# Patient Record
Sex: FEMALE | Race: WHITE | NOT HISPANIC OR LATINO | Employment: OTHER | URBAN - NONMETROPOLITAN AREA
[De-identification: names, ages, dates, MRNs, and addresses within clinical notes are randomized per-mention and may not be internally consistent; named-entity substitution may affect disease eponyms.]

---

## 2023-10-02 ENCOUNTER — PREP FOR PROCEDURE (OUTPATIENT)
Dept: OPERATING ROOM | Facility: HOSPITAL | Age: 68
End: 2023-10-02
Payer: MEDICARE

## 2023-10-02 DIAGNOSIS — H02.9 BENIGN LESION OF EYELID: ICD-10-CM

## 2023-10-02 DIAGNOSIS — H25.811 COMBINED FORMS OF AGE-RELATED CATARACT OF RIGHT EYE: Primary | ICD-10-CM

## 2023-10-02 RX ORDER — PREDNISOLONE ACETATE 10 MG/ML
1 SUSPENSION/ DROPS OPHTHALMIC ONCE
Status: DISCONTINUED | OUTPATIENT
Start: 2023-10-05 | End: 2023-10-13 | Stop reason: HOSPADM

## 2023-10-02 RX ORDER — KETOROLAC TROMETHAMINE 5 MG/ML
1 SOLUTION OPHTHALMIC ONCE
Status: CANCELLED | OUTPATIENT
Start: 2023-10-05

## 2023-10-02 RX ORDER — MOXIFLOXACIN 5 MG/ML
1 SOLUTION/ DROPS OPHTHALMIC ONCE
Status: DISCONTINUED | OUTPATIENT
Start: 2023-10-05 | End: 2023-10-13 | Stop reason: HOSPADM

## 2023-10-02 RX ORDER — TETRACAINE HYDROCHLORIDE 5 MG/ML
1 SOLUTION OPHTHALMIC ONCE
Status: CANCELLED | OUTPATIENT
Start: 2023-10-05

## 2023-10-02 RX ORDER — POVIDONE-IODINE 5 %
1 SOLUTION, NON-ORAL OPHTHALMIC (EYE) ONCE
Status: CANCELLED | OUTPATIENT
Start: 2023-10-05

## 2023-10-02 NOTE — H&P
History Of Present Illness  Nicole Jesus is a 67 y.o. female presenting with Cataract Right eye.     Past Medical History  She has no past medical history on file.    Surgical History  She has no past surgical history on file.     Social History  She has no history on file for tobacco use, alcohol use, and drug use.    Family History  No family history on file.     Allergies  Patient has no allergy information on record.    Review of Systems     Physical Exam     Last Recorded Vitals  There were no vitals taken for this visit.    Relevant Results             Assessment/Plan   Diagnoses and all orders for this visit:  Combined forms of age-related cataract of right eye  -     Case Request Operating Room: Extraction Cataract with Insertion Intraocular Lens, Excision Lesion Eyelid; Standing  Benign lesion of eyelid  -     Case Request Operating Room: Extraction Cataract with Insertion Intraocular Lens, Excision Lesion Eyelid; Standing  Other orders  -     lidocaine 1%-phenylephrine 1.5% intravitreal injection 2 mL  -     prednisoLONE acetate (Pred-Forte) 1 % ophthalmic suspension 1 drop  -     moxifloxacin (Vigamox) 0.5 % ophthalmic solution 1 drop            Abdirizak Laguerre MD

## 2023-10-05 ENCOUNTER — ANESTHESIA (OUTPATIENT)
Dept: OPERATING ROOM | Facility: HOSPITAL | Age: 68
End: 2023-10-05
Payer: MEDICARE

## 2023-10-05 ENCOUNTER — ANESTHESIA EVENT (OUTPATIENT)
Dept: OPERATING ROOM | Facility: HOSPITAL | Age: 68
End: 2023-10-05
Payer: MEDICARE

## 2023-10-05 ENCOUNTER — HOSPITAL ENCOUNTER (OUTPATIENT)
Facility: HOSPITAL | Age: 68
Setting detail: OUTPATIENT SURGERY
Discharge: HOME | End: 2023-10-05
Attending: OPHTHALMOLOGY | Admitting: OPHTHALMOLOGY
Payer: MEDICARE

## 2023-10-05 VITALS
DIASTOLIC BLOOD PRESSURE: 76 MMHG | WEIGHT: 123.9 LBS | BODY MASS INDEX: 22.8 KG/M2 | OXYGEN SATURATION: 96 % | SYSTOLIC BLOOD PRESSURE: 149 MMHG | TEMPERATURE: 98.4 F | RESPIRATION RATE: 20 BRPM | HEART RATE: 96 BPM | HEIGHT: 62 IN

## 2023-10-05 DIAGNOSIS — H25.811 COMBINED FORMS OF AGE-RELATED CATARACT OF RIGHT EYE: Primary | ICD-10-CM

## 2023-10-05 PROBLEM — H02.9 BENIGN LESION OF EYELID: Status: RESOLVED | Noted: 2023-10-02 | Resolved: 2023-10-05

## 2023-10-05 PROCEDURE — 7100000009 HC PHASE TWO TIME - INITIAL BASE CHARGE: Performed by: OPHTHALMOLOGY

## 2023-10-05 PROCEDURE — 2580000001 HC RX 258 IV SOLUTIONS: Performed by: OPHTHALMOLOGY

## 2023-10-05 PROCEDURE — 7100000010 HC PHASE TWO TIME - EACH INCREMENTAL 1 MINUTE: Performed by: OPHTHALMOLOGY

## 2023-10-05 PROCEDURE — 3600000008 HC OR TIME - EACH INCREMENTAL 1 MINUTE - PROCEDURE LEVEL THREE: Performed by: OPHTHALMOLOGY

## 2023-10-05 PROCEDURE — C1780 LENS, INTRAOCULAR (NEW TECH): HCPCS | Performed by: OPHTHALMOLOGY

## 2023-10-05 PROCEDURE — 2500000004 HC RX 250 GENERAL PHARMACY W/ HCPCS (ALT 636 FOR OP/ED): Performed by: ANESTHESIOLOGY

## 2023-10-05 PROCEDURE — 2500000001 HC RX 250 WO HCPCS SELF ADMINISTERED DRUGS (ALT 637 FOR MEDICARE OP): Performed by: OPHTHALMOLOGY

## 2023-10-05 PROCEDURE — 2500000005 HC RX 250 GENERAL PHARMACY W/O HCPCS: Performed by: OPHTHALMOLOGY

## 2023-10-05 PROCEDURE — 2760000001 HC OR 276 NO HCPCS: Performed by: OPHTHALMOLOGY

## 2023-10-05 PROCEDURE — 3700000001 HC GENERAL ANESTHESIA TIME - INITIAL BASE CHARGE: Performed by: OPHTHALMOLOGY

## 2023-10-05 PROCEDURE — 2720000007 HC OR 272 NO HCPCS: Performed by: OPHTHALMOLOGY

## 2023-10-05 PROCEDURE — 3700000002 HC GENERAL ANESTHESIA TIME - EACH INCREMENTAL 1 MINUTE: Performed by: OPHTHALMOLOGY

## 2023-10-05 PROCEDURE — 3600000003 HC OR TIME - INITIAL BASE CHARGE - PROCEDURE LEVEL THREE: Performed by: OPHTHALMOLOGY

## 2023-10-05 DEVICE — IMPLANTABLE DEVICE: Type: IMPLANTABLE DEVICE | Site: EYE | Status: FUNCTIONAL

## 2023-10-05 RX ORDER — ASPIRIN 81 MG/1
81 TABLET ORAL DAILY
COMMUNITY
Start: 2022-10-04

## 2023-10-05 RX ORDER — CILOSTAZOL 100 MG/1
100 TABLET ORAL 2 TIMES DAILY
COMMUNITY
Start: 2022-07-13

## 2023-10-05 RX ORDER — TIZANIDINE 4 MG/1
4 TABLET ORAL DAILY
COMMUNITY
Start: 2017-05-17

## 2023-10-05 RX ORDER — CHOLECALCIFEROL (VITAMIN D3) 25 MCG
1000 TABLET ORAL
COMMUNITY

## 2023-10-05 RX ORDER — SODIUM CHLORIDE, SODIUM LACTATE, POTASSIUM CHLORIDE, CALCIUM CHLORIDE 600; 310; 30; 20 MG/100ML; MG/100ML; MG/100ML; MG/100ML
100 INJECTION, SOLUTION INTRAVENOUS CONTINUOUS
Status: DISCONTINUED | OUTPATIENT
Start: 2023-10-05 | End: 2023-10-05 | Stop reason: HOSPADM

## 2023-10-05 RX ORDER — GABAPENTIN 300 MG/1
300 CAPSULE ORAL DAILY
COMMUNITY
Start: 2018-02-08 | End: 2023-12-09

## 2023-10-05 RX ORDER — LIDOCAINE HYDROCHLORIDE AND EPINEPHRINE 10; 10 MG/ML; UG/ML
INJECTION, SOLUTION INFILTRATION; PERINEURAL AS NEEDED
Status: DISCONTINUED | OUTPATIENT
Start: 2023-10-05 | End: 2023-10-05 | Stop reason: HOSPADM

## 2023-10-05 RX ORDER — KETOROLAC TROMETHAMINE 5 MG/ML
1 SOLUTION OPHTHALMIC ONCE
Status: COMPLETED | OUTPATIENT
Start: 2023-10-05 | End: 2023-10-05

## 2023-10-05 RX ORDER — MIDAZOLAM HYDROCHLORIDE 1 MG/ML
1 INJECTION INTRAMUSCULAR; INTRAVENOUS ONCE
Status: COMPLETED | OUTPATIENT
Start: 2023-10-05 | End: 2023-10-05

## 2023-10-05 RX ORDER — LISINOPRIL AND HYDROCHLOROTHIAZIDE 12.5; 2 MG/1; MG/1
1 TABLET ORAL
COMMUNITY
Start: 2017-06-06

## 2023-10-05 RX ORDER — TETRACAINE HYDROCHLORIDE 5 MG/ML
1 SOLUTION OPHTHALMIC ONCE
Status: COMPLETED | OUTPATIENT
Start: 2023-10-05 | End: 2023-10-05

## 2023-10-05 RX ORDER — MULTIVITAMIN
1 TABLET ORAL
COMMUNITY

## 2023-10-05 RX ORDER — MIDAZOLAM HYDROCHLORIDE 1 MG/ML
INJECTION, SOLUTION INTRAMUSCULAR; INTRAVENOUS AS NEEDED
Status: DISCONTINUED | OUTPATIENT
Start: 2023-10-05 | End: 2023-10-05

## 2023-10-05 RX ORDER — POVIDONE-IODINE 5 %
1 SOLUTION, NON-ORAL OPHTHALMIC (EYE) ONCE
Status: COMPLETED | OUTPATIENT
Start: 2023-10-05 | End: 2023-10-05

## 2023-10-05 RX ORDER — ALPRAZOLAM 0.5 MG/1
1 TABLET ORAL NIGHTLY PRN
COMMUNITY
Start: 2023-08-28

## 2023-10-05 RX ORDER — NYSTATIN 100000 [USP'U]/ML
500000 SUSPENSION ORAL EVERY 6 HOURS PRN
COMMUNITY
Start: 2023-06-12 | End: 2024-03-29 | Stop reason: HOSPADM

## 2023-10-05 RX ORDER — ATORVASTATIN CALCIUM 10 MG/1
10 TABLET, FILM COATED ORAL
COMMUNITY
Start: 2023-06-12

## 2023-10-05 RX ORDER — CELECOXIB 200 MG/1
200 CAPSULE ORAL DAILY
COMMUNITY
Start: 2017-05-11

## 2023-10-05 RX ADMIN — MIDAZOLAM HYDROCHLORIDE 1 MG: 1 INJECTION, SOLUTION INTRAMUSCULAR; INTRAVENOUS at 13:29

## 2023-10-05 RX ADMIN — TETRACAINE HYDROCHLORIDE 1 DROP: 5 SOLUTION OPHTHALMIC at 12:28

## 2023-10-05 RX ADMIN — POVIDONE-IODINE 1 APPLICATION: 5 SOLUTION OPHTHALMIC at 12:28

## 2023-10-05 RX ADMIN — KETOROLAC TROMETHAMINE 1 DROP: 5 SOLUTION OPHTHALMIC at 12:29

## 2023-10-05 RX ADMIN — POLYVINYL ALCOHOL, POVIDONE 1 DROP: 14; 6 SOLUTION/ DROPS OPHTHALMIC at 12:29

## 2023-10-05 RX ADMIN — MIDAZOLAM 1 MG: 1 INJECTION INTRAMUSCULAR; INTRAVENOUS at 13:50

## 2023-10-05 RX ADMIN — SODIUM CHLORIDE, POTASSIUM CHLORIDE, SODIUM LACTATE AND CALCIUM CHLORIDE 100 ML/HR: 600; 310; 30; 20 INJECTION, SOLUTION INTRAVENOUS at 12:32

## 2023-10-05 RX ADMIN — PHENYLEPHRINE HYDROCHLORIDE 1 DROP: 100 SOLUTION/ DROPS OPHTHALMIC at 12:29

## 2023-10-05 RX ADMIN — MIDAZOLAM 1 MG: 1 INJECTION INTRAMUSCULAR; INTRAVENOUS at 13:47

## 2023-10-05 SDOH — HEALTH STABILITY: MENTAL HEALTH: CURRENT SMOKER: 1

## 2023-10-05 ASSESSMENT — PAIN SCALES - GENERAL
PAINLEVEL_OUTOF10: 0 - NO PAIN
PAIN_LEVEL: 0

## 2023-10-05 ASSESSMENT — COLUMBIA-SUICIDE SEVERITY RATING SCALE - C-SSRS
2. HAVE YOU ACTUALLY HAD ANY THOUGHTS OF KILLING YOURSELF?: NO
1. IN THE PAST MONTH, HAVE YOU WISHED YOU WERE DEAD OR WISHED YOU COULD GO TO SLEEP AND NOT WAKE UP?: NO

## 2023-10-05 ASSESSMENT — PAIN - FUNCTIONAL ASSESSMENT: PAIN_FUNCTIONAL_ASSESSMENT: 0-10

## 2023-10-05 NOTE — ANESTHESIA PREPROCEDURE EVALUATION
Patient: Nicole Jesus    Procedure Information       Date/Time: 10/05/23 1330    Procedures:       CATARACT EXTRACTION W/IOL IMPLANT R EYE (Right: Eye)      REMOVAL SKIN LESION R UPPER LID (Right: Eye)    Location: [facility] OR 05 / Virtual [facility] OR    Surgeons: Abdirizak Laguerre MD            Relevant Problems   No relevant active problems       Clinical information reviewed:   Tobacco  Allergies  Meds   Med Hx  Surg Hx   Fam Hx  Soc Hx        NPO Detail:  NPO/Void Status  Date of Last Liquid: 10/04/23  Time of Last Liquid: 2359  Date of Last Solid: 10/04/23  Time of Last Solid: 2359  Time of Last Void: 1145         Physical Exam    Airway  Mallampati: II     Cardiovascular    Dental   Comments: Poor dentition   Pulmonary    Abdominal            Anesthesia Plan    ASA 2     MAC     The patient is a current smoker.  Patient was not previously instructed to abstain from smoking on day of procedure.  Patient did not smoke on day of procedure.    intravenous induction   Anesthetic plan and risks discussed with patient.

## 2023-10-05 NOTE — ANESTHESIA POSTPROCEDURE EVALUATION
Patient: Nicole Jesus    Procedure Summary       Date: 10/05/23 Room / Location: [facility] OR 05 / [facility]    Anesthesia Start: 1340 Anesthesia Stop: 1409    Procedures:       CATARACT EXTRACTION W/IOL IMPLANT R EYE (Right: Eye)      REMOVAL SKIN LESION R UPPER LID (Right: Eye) Diagnosis:       Combined forms of age-related cataract of right eye      Benign lesion of eyelid      (Combined forms of age-related cataract of right eye [H25.811])      (Benign lesion of eyelid [H02.9])    Surgeons: Abdirizak Laguerre MD Responsible Provider: Dallas Rodriguez MD    Anesthesia Type: MAC ASA Status: 2            Anesthesia Type: MAC    Vitals     BP     Temp     Pulse     Resp     SpO2         Anesthesia Post Evaluation    Patient location during evaluation: PACU  Patient participation: complete - patient participated  Level of consciousness: awake and alert  Pain score: 0  Pain management: adequate  Airway patency: patent  Cardiovascular status: acceptable  Respiratory status: acceptable  Hydration status: acceptable        No notable events documented.

## 2023-10-05 NOTE — H&P
Reason for Visit    Reason for Visit -  Reason Comments   Cataract Follow Up Patient is here to sign basic consents for the right eye.   Blurred Vision Right Eye     Difficulty Reading Right Eye     Glare Right Eye     Encounter Details    Encounter Details  Date Type Department Care Team Description   09/22/2023 9:15 AM EDT Office Visit [facility] Ophthalmology   [address] [id number]   [phone]  Abdirizak Laguerre MD   [address] [place] [address]   [phone] (Work)   [phone] (Fax)  Combined form of age-related cataract, right eye (Primary Dx);  Combined form of age-related cataract, left eye;  Benign lesion of eyelid;  Amblyopia of both eyes;  Essential hypertension;  Hypercholesteremia     Social History  - documented as of this encounter  Social History  Tobacco Use Types Packs/Day Years Used Date   Smoking Tobacco: Every Day Cigarettes 1.5   Started: 01/06/1971   Smokeless Tobacco: Never             Social History  Area Deprivation Index Answer Date Recorded   National Score (1-100), lower number is lower risk 57     State Score (1-10), lower number is lower risk 3     Data from: [email or web address] Last address used for calculation [address] [place] [address]       Social History  Sex and Gender Information Value Date Recorded   Sex Assigned at Birth Not on file     Gender Identity Not on file     Sexual Orientation Not on file       Last Filed Vital Signs  - documented in this encounter  Last Filed Vital Signs  Vital Sign Reading Time Taken Comments   Blood Pressure 148/69 09/22/2023 10:04 AM EDT     Pulse 111 09/22/2023 10:04 AM EDT     Temperature - -     Respiratory Rate - -     Oxygen Saturation - -     Inhaled Oxygen Concentration - -     Weight - -     Height - -     Body Mass Index - -       Patient Instructions  - documented in this encounter  Patient Instructions  Abdirizak Laguerre MD - 09/22/2023 9:53 AM EDT   Formatting of this note is different  from the original.  Current Ophthalmic Meds        fluorometholone (FML LIQUID FILM) 0.1 % ophthalmic suspension Use 1 Drop in both eyes three times daily, until scheduled surgery date, then stop in the right eye.    Continue:  Systane Complete solution instill 1 drop 3 times daily Both Eyes.    Plan Cataract Surgery with Monofocal Intraocular Lens Implant And Growh removal Right Eye on 10/05/2023 at [facility].    If you have any questions please contact our office at [phone].  After office hours or on the weekend, please call Dr. Laguerre on his cell phone at [phone].    Electronically signed by Abdirizak Laguerre MD at 09/22/2023 10:08 AM EDT     Progress Notes  - documented in this encounter  Abdirizak Laguerre MD - 09/22/2023 9:49 AM EDT  Formatting of this note is different from the original.  ASSESSMENT/PLAN:  1. Combined form of age-related cataract, right eye - ICD9: 366.19, ICD10: H25.811 (primary diagnosis)  2. Combined form of age-related cataract, left eye - ICD9: 366.19, ICD10: H25.812    Blood pressure 148/69, pulse 111.    Cataract Presurgical Documentation    Cataract: Right eye (OD) then Left eye (OS)    Current Visual Acuity  Right Eye Distance CC 20/50  Left Eye Distance CC 20/40    Visual Function: Nicole Jesus states that the decline in vision from the cataract impedes her abilities as listed in the HPI, as well as other activities of daily living.    Nicole Jesus has confirmed that she is no longer able to function adequately on a day-to-day basis because of her current visual condition.  Upon eye examination, patient was found to have a visually significant cataract right eye. Discussed cataract surgery with patient and different intraocular lens implant options with patient: basic monofocal intraocular lens implant, Toric intraocular lens implant, and presbyopia correction intraocular lens implant. In my medical opinion, based on medical history  and ocular examination, cataract surgery with intraocular lens implant will correct patient's vision and improve quality of patient's daily living activities. Patient wishes to have traditional cataract surgery with basic intraocular lens right eye on 10/05/2023 at [facility]. Patient wishes to have cataract surgery with the option stated above. Patient understands that an intraocular lens implant does not necessarily replace the need for glasses. Patient understands that it is impossible for the surgeon to inform him/her of every possible complication that may occur. The surgeon has answered all of the patient's questions. Patient understands that if he/she has a mature or dense cataract, pseudoexfoliation cataract, or history of use of Flomax, he/she may require the use of Maluyugin Ring and/or Vision Blue during surgery. Patient understands the risks, benefits, and alternatives to surgery.  Further, it is my medical opinion that the cataract is the primary cause, or at least a significantly contributory cause of her visual dysfunction. With uncomplicated cataract surgery and lens implantation, it is my expectation that her visual function and quality of life will improve, significantly.    The risks, benefits, alternatives, personnel and complications of cataract surgery with lens implantation were discussed with Nicole Jesus in detail. she appeared to understand and asked that I proceed with plans for surgery.    PHYSICAL EXAM:    Vital Signs:  Blood pressure 148/69, pulse 111.    Respiratory:  Normal breath sounds, no wheezing.    CARD:  Normal heart sounds 1 & 2, normal sinus rhythm.    Current Ophthalmic Meds        fluorometholone (FML LIQUID FILM) 0.1 % ophthalmic suspension Use 1 Drop in both eyes three times daily, until scheduled surgery date, then stop in the right eye.    Continue:  Systane Complete solution instill 1 drop 3 times daily Both Eyes.    3. Benign lesion  of eyelid - ICD9: 374.9, ICD10: H02.9    -Patient is scheduled for excision of benign skin lesion right upper eyelid on 10/05/2023 at [facility].    4. Amblyopia of both eyes - ICD9: 368.00, ICD10: H53.003    Monitor    5. Essential hypertension - ICD9: 401.9, ICD10: I10    Continue to monitor with primary care physician.    6. Hypercholesteremia - ICD9: 272.0, ICD10: E78.00    Continue to monitor with primary care physician.    Abdirizak Laguerre MD  I have confirmed and edited as necessary the relevant ophthalmic history, review of systems, surgical history, and ophthalmological examination findings as obtained by the ophthalmic technical staff. I have seen and examined Nicole Jesus. I have discussed the examination findings, diagnosis, and treatment options with Nicole Jesus and/or her family. I have also reviewed and agree with the assessment and plan as stated above and agree with all its relevant components. I gave the patient the opportunity to ask questions about the findings, diagnosis, and treatment options.      Electronically signed by Abdirizak Laguerre MD at 09/22/2023 10:09 AM EDT   Plan of Treatment  - documented as of this encounter  Plan of Treatment - Upcoming Encounters  Upcoming Encounters  Date Type Department Care Team Description   10/06/2023 1:00 PM EDT Office Visit [facility] Ophthalmology   [address] [place] [address]   [phone]  Abdirizak Laguerre MD   [address] [place] [address]   [phone] (Work)   [phone] (Fax)        Plan of Treatment - Scheduled Procedures  Scheduled Procedures  Name Priority Associated Diagnoses Date/Time   SURGERY AT [facility] [place] operated by [facility]   Combined form of age-related cataract, right eye   Lesion of right eyelid  10/05/2023 11:55 AM EDT     Visit Diagnoses  - documented in this encounter  Visit Diagnoses  Diagnosis   Combined form of age-related cataract, right eye - Primary    Combined form of age-related  cataract, left eye    Benign lesion of eyelid   Unspecified disorder of eyelid    Amblyopia of both eyes   Amblyopia, unspecified    Essential hypertension   Unspecified essential hypertension    Hypercholesteremia   Pure hypercholesterolemia      Eye Exam    Eye Exam - Visual Acuity (Snellen - Linear)  Visual Acuity (Snellen - Linear)    Right eye Left eye   Dist cc 20/50 -2 20/40 -1   Near cc J7 J7     Eye Exam  Correction: Glasses     Eye Exam - Tonometry (Applanation, 10:00 AM)  Tonometry (Applanation, 10:00 AM)    Right eye Left eye   Pressure 13 13     Eye Exam - Pupils  Pupils    Pupils Dark Light Shape React APD   Right eye PERRL 4 2 Round Brisk None   Left eye PERRL 4 2 Round Brisk None     Eye Exam - Visual Fields  Visual Fields    Right eye Left eye     Full Full     Eye Exam - Extraocular Movement  Extraocular Movement    Right eye Left eye     Full Full     Eye Exam - Neuro/Psych  Neuro/Psych  Oriented x3: Yes   Mood/Affect: Normal     Eye Exam - Glare Testing  Glare Testing    High   Right eye     Left eye 20/100     Eye Exam - External Exam  External Exam    Right eye Left eye   External Normal including orbits and preauricular lymph nodes Normal including orbits and preauricular lymph nodes     Eye Exam - Slit Lamp Exam  Slit Lamp Exam    Right eye Left eye   Lids/Lashes Multiple skin lesion upper lids (3), each growth 3 mm in size, ptosis Multiple skin lesion 3 upper lid, lesion left lateral canthus, ptosis3 mm in size each   Conjunctiva/Sclera White and quiet White and quiet   Cornea Normal epithelium, stroma, endothelium, and tear film Normal epithelium, stroma, endothelium, and tear film   Anterior Chamber Deep and quiet Deep and quiet   Iris Round and reactive Round and reactive   Lens 3-4+ Nuclear sclerotic cataract, 3-4+ Posterior subcapsular cataract 3-4+ Nuclear sclerotic cataract, 3-4+ Posterior subcapsular cataract   Anterior Vitreous Vitreous floaters Vitreous floaters     Eye Exam -  Fundus Exam  Fundus Exam    Right eye Left eye   Disc Normal Normal   C/D Ratio 0.2 0.2   Macula Normal Normal   Vessels Normal Normal   Periphery Normal Normal     Eye Exam - Wearing Rx  Wearing Rx    Sphere Cylinder Axis Add   Right eye +4.50 +1.25 175 +1.50   Left eye +2.50 +0.75 164 +2.00     Care Teams  - documented as of this encounter  Care Teams  Team Member Relationship Specialty Start Date End Date   Shimon Askew (Hist)   [place] [address]  PCP - General Family Medicine 9/6/23

## 2023-10-05 NOTE — OP NOTE
CATARACT EXTRACTION W/IOL IMPLANT R EYE (R), REMOVAL SKIN LESION R UPPER LID (R) Operative Note     Date: 10/5/2023  OR Location: [facility] OR    Name: Nicole Jesus, : 1955, Age: 67 y.o., MRN: [id number], Sex: female    Diagnosis  Pre-op Diagnosis     * Combined forms of age-related cataract of right eye [H25.811]     * Benign lesion of eyelid [H02.9] Post-op Diagnosis     * Combined forms of age-related cataract of right eye [H25.811]     * Benign lesion of eyelid [H02.9]     Procedures  CATARACT EXTRACTION W/IOL IMPLANT R EYE  47490 - AL XCAPSL CTRC RMVL INSJ IO LENS PROSTH W/O ECP    REMOVAL SKIN LESION R UPPER LID  93564 - AL EXC LESION EYELID W/O CLSR/W/SIMPLE DIR CLOSURE  3mm in size x 3    Surgeons      * Abdirizak Laguerre - Primary    Resident/Fellow/Other Assistant:  No surgical staff documented.    Procedure Summary  Anesthesia: Monitor Anesthesia Care  ASA: II  Anesthesia Staff: Anesthesiologist: Dallas Rodriguez MD  Estimated Blood Loss: none  Intra-op Medications:   Medication Name Total Dose   lidocaine-epinephrine (Xylocaine W/EPI) 1 %-1:100,000 injection 0.5 mL     Anesthesia Record        Intraprocedure I/O Totals       None      Specimen: No specimens collected     Staff:   Circulator: Mike Ortiz RN  Scrub Person: Alex Colindres RN    Drains and/or Catheters: * None in log *    Implants:  Implants       Type Name Action Serial No.      Lens LENS, INTRAOCULAR, SN60WF 29.5 - [id number] - [id number] Implanted [id number]              Findings: Combined Form Age Related Cataract Right Eye                    Benign Lesion of Eyelid Right Upper Eyelid    Indications: Nicole Jesus is an 67 y.o. female who is having surgery for Combined forms of age-related cataract of right eye [H25.811]  Benign lesion of eyelid [H02.9] 3mm in size x 3    The patient was correctly identified and the patient's operative eye was marked with a marking pen and verified with the patient in the pre-operative area.    The operative eye was dilated in the preoperative area.  The patient was then taken to the operating room where timeout was performed before starting the procedure. Combined anesthesia  with intravenous sedation and topical tetracaine eyedrops were instilled into the right eye. The operative eye  was prepped and draped in the standard sterile ophthalmic fashion in  preparation for ophthalmic surgery.  A Jesus wire speculum was then inserted between the eyelids of the right eye and the operating microscope was placed over the right eye.  A paracentesis incision was made approximately 30° away from the planned surgical incision site with the help of MVR blade.  1% lidocaine MPF with Phenylephrine 1.5% PF was injected into the anterior chamber through the paracentesis incision. A near limbal clear corneal incision was fashioned in the temporal quadrant just outside the vascular arcade and Viscoat was injected into anterior chamber to firm the eye. A bent needle cystotome was used and Utrata forceps were utilized to create a continuous curvilinear capsulorrhexis.  BSS was injected beneath the anterior capsule to hydrodissect the nucleus from adjacent cortex and capsule.  The residual cortex was then aspirated with irrigation/aspiration handpiece. The posterior capsule was then polished with the help of soft irrigation-aspiration tip.  Provisc viscoelastic was then injected into the eye to reform the anterior chamber and to open the capsular bag.  The intraocular lens implant was taken from its sterile wrapping, inspected under the surgical microscope and found to be in good condition. The intraocular lens implant 29.5D was injected into the capsule bag. The Provisc was then aspirated from the anterior chamber and from behind the intraocular lens implant.  The anterior chamber was inflated with the help of BSS to moderate tension.  And the edges of the surgical incision were then hydrated with the help of BSS.   Vigamox was then injected into the anterior chamber and into the capsule bag through the paracentesis incision. The surgical wound was then inspected and found to be watertight.  The wire speculum and drapes were then removed.      Attention was then given to the benign lesion right upper eyelid.  1% Lidocaine with epinephrine ws injected at the site.  The lesion was 3mm in size times 3 and was removed with blunt sharp dissection.  The bleeding was controlled with cautery.    Pred Forte eyedrops, Acular eyedrops and Betadine 5% sterile ophthalmic solution were instilled in the conjunctival sac.     The patient tolerated the procedure well and was taken to recovery room in stable condition.    Attending Attestation: I performed the procedure.    Abdirizak Laguerre  Phone Number: [phone]

## 2023-10-19 ENCOUNTER — PREP FOR PROCEDURE (OUTPATIENT)
Dept: OPERATING ROOM | Facility: HOSPITAL | Age: 68
End: 2023-10-19
Payer: MEDICARE

## 2023-10-19 DIAGNOSIS — H25.812 COMBINED FORMS OF AGE-RELATED CATARACT OF LEFT EYE: Primary | ICD-10-CM

## 2023-10-19 RX ORDER — KETOROLAC TROMETHAMINE 5 MG/ML
1 SOLUTION OPHTHALMIC
Status: CANCELLED | OUTPATIENT
Start: 2023-11-09 | End: 2023-11-09

## 2023-10-19 RX ORDER — TETRACAINE HYDROCHLORIDE 5 MG/ML
1 SOLUTION OPHTHALMIC ONCE
Status: CANCELLED | OUTPATIENT
Start: 2023-11-09

## 2023-10-19 RX ORDER — PREDNISOLONE ACETATE 10 MG/ML
1 SUSPENSION/ DROPS OPHTHALMIC ONCE
Status: CANCELLED | OUTPATIENT
Start: 2023-11-09

## 2023-10-19 RX ORDER — POVIDONE-IODINE 5 %
SOLUTION, NON-ORAL OPHTHALMIC (EYE) ONCE
Status: CANCELLED | OUTPATIENT
Start: 2023-11-09

## 2023-11-07 NOTE — PREPROCEDURE INSTRUCTIONS
No outpatient medications have been marked as taking for the 11/9/23 encounter (Hospital Encounter).                       NPO Instructions:  Nothing to eat or drink after midnight      Additional Instructions:     Will need  home. Will receive call day before surgery with arrival time

## 2023-11-08 ENCOUNTER — ANESTHESIA EVENT (OUTPATIENT)
Dept: OPERATING ROOM | Facility: HOSPITAL | Age: 68
End: 2023-11-08
Payer: MEDICARE

## 2023-11-09 ENCOUNTER — HOSPITAL ENCOUNTER (OUTPATIENT)
Facility: HOSPITAL | Age: 68
Setting detail: OUTPATIENT SURGERY
Discharge: HOME | End: 2023-11-09
Attending: OPHTHALMOLOGY | Admitting: OPHTHALMOLOGY
Payer: MEDICARE

## 2023-11-09 ENCOUNTER — ANESTHESIA (OUTPATIENT)
Dept: OPERATING ROOM | Facility: HOSPITAL | Age: 68
End: 2023-11-09
Payer: MEDICARE

## 2023-11-09 VITALS
BODY MASS INDEX: 23.25 KG/M2 | HEIGHT: 62 IN | TEMPERATURE: 97.5 F | WEIGHT: 126.32 LBS | RESPIRATION RATE: 22 BRPM | HEART RATE: 83 BPM | OXYGEN SATURATION: 97 % | SYSTOLIC BLOOD PRESSURE: 139 MMHG | DIASTOLIC BLOOD PRESSURE: 8 MMHG

## 2023-11-09 PROBLEM — H25.812 COMBINED FORMS OF AGE-RELATED CATARACT OF LEFT EYE: Status: RESOLVED | Noted: 2023-10-19 | Resolved: 2023-11-09

## 2023-11-09 PROCEDURE — 3600000008 HC OR TIME - EACH INCREMENTAL 1 MINUTE - PROCEDURE LEVEL THREE: Performed by: OPHTHALMOLOGY

## 2023-11-09 PROCEDURE — 3700000001 HC GENERAL ANESTHESIA TIME - INITIAL BASE CHARGE: Performed by: OPHTHALMOLOGY

## 2023-11-09 PROCEDURE — 2500000004 HC RX 250 GENERAL PHARMACY W/ HCPCS (ALT 636 FOR OP/ED): Performed by: ANESTHESIOLOGY

## 2023-11-09 PROCEDURE — 2720000007 HC OR 272 NO HCPCS: Performed by: OPHTHALMOLOGY

## 2023-11-09 PROCEDURE — 3700000002 HC GENERAL ANESTHESIA TIME - EACH INCREMENTAL 1 MINUTE: Performed by: OPHTHALMOLOGY

## 2023-11-09 PROCEDURE — 7100000009 HC PHASE TWO TIME - INITIAL BASE CHARGE: Performed by: OPHTHALMOLOGY

## 2023-11-09 PROCEDURE — 2760000001 HC OR 276 NO HCPCS: Performed by: OPHTHALMOLOGY

## 2023-11-09 PROCEDURE — 7100000010 HC PHASE TWO TIME - EACH INCREMENTAL 1 MINUTE: Performed by: OPHTHALMOLOGY

## 2023-11-09 PROCEDURE — 3600000003 HC OR TIME - INITIAL BASE CHARGE - PROCEDURE LEVEL THREE: Performed by: OPHTHALMOLOGY

## 2023-11-09 PROCEDURE — 2500000005 HC RX 250 GENERAL PHARMACY W/O HCPCS: Performed by: OPHTHALMOLOGY

## 2023-11-09 PROCEDURE — C1780 LENS, INTRAOCULAR (NEW TECH): HCPCS | Performed by: OPHTHALMOLOGY

## 2023-11-09 PROCEDURE — 2500000001 HC RX 250 WO HCPCS SELF ADMINISTERED DRUGS (ALT 637 FOR MEDICARE OP): Performed by: OPHTHALMOLOGY

## 2023-11-09 DEVICE — IMPLANTABLE DEVICE: Type: IMPLANTABLE DEVICE | Site: EYE | Status: FUNCTIONAL

## 2023-11-09 RX ORDER — SODIUM CHLORIDE, SODIUM LACTATE, POTASSIUM CHLORIDE, CALCIUM CHLORIDE 600; 310; 30; 20 MG/100ML; MG/100ML; MG/100ML; MG/100ML
100 INJECTION, SOLUTION INTRAVENOUS CONTINUOUS
Status: DISCONTINUED | OUTPATIENT
Start: 2023-11-09 | End: 2023-11-09 | Stop reason: HOSPADM

## 2023-11-09 RX ORDER — MIDAZOLAM HYDROCHLORIDE 1 MG/ML
2 INJECTION INTRAMUSCULAR; INTRAVENOUS ONCE AS NEEDED
Status: DISCONTINUED | OUTPATIENT
Start: 2023-11-09 | End: 2023-11-09 | Stop reason: HOSPADM

## 2023-11-09 RX ORDER — KETOROLAC TROMETHAMINE 5 MG/ML
1 SOLUTION OPHTHALMIC
Status: COMPLETED | OUTPATIENT
Start: 2023-11-09 | End: 2023-11-09

## 2023-11-09 RX ORDER — LIDOCAINE HYDROCHLORIDE 20 MG/ML
INJECTION, SOLUTION INFILTRATION; PERINEURAL AS NEEDED
Status: DISCONTINUED | OUTPATIENT
Start: 2023-11-09 | End: 2023-11-09 | Stop reason: HOSPADM

## 2023-11-09 RX ORDER — MIDAZOLAM HYDROCHLORIDE 1 MG/ML
INJECTION, SOLUTION INTRAMUSCULAR; INTRAVENOUS AS NEEDED
Status: DISCONTINUED | OUTPATIENT
Start: 2023-11-09 | End: 2023-11-09

## 2023-11-09 RX ORDER — TETRACAINE HYDROCHLORIDE 5 MG/ML
1 SOLUTION OPHTHALMIC ONCE
Status: COMPLETED | OUTPATIENT
Start: 2023-11-09 | End: 2023-11-09

## 2023-11-09 RX ORDER — POVIDONE-IODINE 5 %
SOLUTION, NON-ORAL OPHTHALMIC (EYE) ONCE
Status: COMPLETED | OUTPATIENT
Start: 2023-11-09 | End: 2023-11-09

## 2023-11-09 RX ADMIN — PHENYLEPHRINE HYDROCHLORIDE 1 DROP: 100 SOLUTION/ DROPS OPHTHALMIC at 10:39

## 2023-11-09 RX ADMIN — POVIDONE-IODINE 1 APPLICATION: 5 SOLUTION OPHTHALMIC at 10:24

## 2023-11-09 RX ADMIN — SODIUM CHLORIDE, POTASSIUM CHLORIDE, SODIUM LACTATE AND CALCIUM CHLORIDE 100 ML/HR: 600; 310; 30; 20 INJECTION, SOLUTION INTRAVENOUS at 10:53

## 2023-11-09 RX ADMIN — MIDAZOLAM 1 MG: 1 INJECTION INTRAMUSCULAR; INTRAVENOUS at 11:24

## 2023-11-09 RX ADMIN — PHENYLEPHRINE HYDROCHLORIDE 1 DROP: 100 SOLUTION/ DROPS OPHTHALMIC at 10:25

## 2023-11-09 RX ADMIN — KETOROLAC TROMETHAMINE 1 DROP: 5 SOLUTION OPHTHALMIC at 10:25

## 2023-11-09 RX ADMIN — TETRACAINE HYDROCHLORIDE 1 DROP: 5 SOLUTION OPHTHALMIC at 10:25

## 2023-11-09 RX ADMIN — MIDAZOLAM 1 MG: 1 INJECTION INTRAMUSCULAR; INTRAVENOUS at 11:16

## 2023-11-09 RX ADMIN — PHENYLEPHRINE HYDROCHLORIDE 1 DROP: 100 SOLUTION/ DROPS OPHTHALMIC at 10:52

## 2023-11-09 RX ADMIN — KETOROLAC TROMETHAMINE 1 DROP: 5 SOLUTION OPHTHALMIC at 10:52

## 2023-11-09 RX ADMIN — KETOROLAC TROMETHAMINE 1 DROP: 5 SOLUTION OPHTHALMIC at 11:02

## 2023-11-09 RX ADMIN — POLYVINYL ALCOHOL, POVIDONE 1 DROP: 14; 6 SOLUTION/ DROPS OPHTHALMIC at 10:39

## 2023-11-09 RX ADMIN — PHENYLEPHRINE HYDROCHLORIDE 1 DROP: 100 SOLUTION/ DROPS OPHTHALMIC at 11:02

## 2023-11-09 RX ADMIN — KETOROLAC TROMETHAMINE 1 DROP: 5 SOLUTION OPHTHALMIC at 10:39

## 2023-11-09 RX ADMIN — SODIUM CHLORIDE, POTASSIUM CHLORIDE, SODIUM LACTATE AND CALCIUM CHLORIDE 100 ML/HR: 600; 310; 30; 20 INJECTION, SOLUTION INTRAVENOUS at 10:57

## 2023-11-09 SDOH — HEALTH STABILITY: MENTAL HEALTH: CURRENT SMOKER: 0

## 2023-11-09 ASSESSMENT — PAIN - FUNCTIONAL ASSESSMENT
PAIN_FUNCTIONAL_ASSESSMENT: 0-10
PAIN_FUNCTIONAL_ASSESSMENT: 0-10

## 2023-11-09 ASSESSMENT — PAIN SCALES - GENERAL
PAINLEVEL_OUTOF10: 0 - NO PAIN
PAINLEVEL_OUTOF10: 0 - NO PAIN

## 2023-11-09 NOTE — H&P
H&P Notes  - documented in this encounter  Abdirizak Laguerre MD - 10/30/2023 9:50 AM EDT  Formatting of this note is different from the original.  HISTORY AND PHYSICAL EXAMINATION    SERVICE DATE: 10/30/2023  SERVICE TIME:    PRIMARY CARE PHYSICIAN: Shimon Askew (Hist)    REASON FOR VISIT:  Nicole Jesus is a 67 year old female who is being seen for Combined form age-related cataract left eye and benign skin lesion upper and lower eyelid    The patient has the following:  ACTIVE PROBLEM LIST  Combined Form of Age-Related Cataract, Left Eye  Amblyopia of Both Eyes  Punctate Keratitis, Bilateral  Epilepsy (Hcc)  Htn (Hypertension)  Hyperlipidemia  Status Post Extracapsular Cataract Extraction of Right Eye    SUBJECTIVE  CHIEF COMPLAINT: Combined form age-related cataract left eye, benign skin lesion upper and lower eyelid  HPI: Blurred vision for distance and near, difficulty reading small print, glare and halos around lights    PAST MEDICAL HISTORY  Diagnosis Date  Anxiety  Back pain  Diabetes mellitus (HCC)  Epilepsy (HCC)  HTN (hypertension)  Hyperlipidemia  Neuroendocrine tumor    PAST SURGICAL HISTORY  Procedure Laterality Date  APPENDECTOMY 2006  BLEPHAROPLASTY UPPER EYELID W/EXCESSIVE SKIN Bilateral 2003  BREAST SURGERY HX 2007  enhancement  CYSTOCELE REPAIR 2004  ERCP 12/06/2011  with stent  REMV CATARACT EXTRACAP,INSERT LENS Right 10/05/2023  SN60WF 29.5  SLING OPER STRES INCONTINENCE 2005  TONSILLECTOMY & ADENOIDECTOMY <AGE 13  VAGINAL HYSTERECTOMY 2001    FAMILY HISTORY  Problem Relation Age of Onset  Cataract Mother    SOCIAL HISTORY:  Social History    Tobacco Use  Smoking status: Every Day  Packs/day: 1.5  Types: Cigarettes  Start date: 1/6/1971  Smokeless tobacco: Never    MEDICATIONS:  Prior to Admission medications as of 10/30/23 0940  Medication Sig Last Dose Taking  fluorometholone (FML LIQUID FILM) 0.1 % ophthalmic suspension Use 1 Drop in the left eye three times a day. Taking Yes  ALPRAZolam  (XANAX) 0.5 mg tablet Take 1 tablet by mouth every 12 hours. Taking Yes  gabapentin (NEURONTIN) 300 mg capsule Take 300 mg by mouth twice daily. Taking Yes  aspirin, enteric coated (ASPIRIN, ENTERIC COATED) 81 mg EC tablet Take 1 (one) tablet (81 mg total) by mouth daily. Taking Yes  atorvastatin (LIPITOR) 10 mg tablet Take 10 mg by mouth once daily. Taking Yes  celecoxib (CELEBREX) 200 mg capsule Take 200 mg by mouth twice daily. Taking Yes  cholecalciferol (VITAMIN D3) 1,000 unit tab tablet Take 1,000 Units by mouth. Taking Yes  cilostazol (PLETAL) 100 mg tablet once daily.  Taking Yes  lisinopril-hydroCHLOROthiazide (PRINZIDE,ZESTORETIC) 20-12.5 mg per tablet Take 1 tablet by mouth once daily. Taking Yes  multivitamin tablet Take 1 tablet by mouth once daily. Taking Yes  nystatin (MYCOSTATIN) 100,000 unit/mL suspension Use 500,000 Units as instructed. Taking Yes  tiZANidine (ZANAFLEX) 4 mg tablet Take 4 mg by mouth twice daily. Taking Yes  keTORolac (ACULAR) 0.5 % ophthalmic solution Use 1 Drop in the right eye three times a day.  prednisoLONE acetate (PRED FORTE) 1 % ophthalmic suspension Use 1 Drop in the right eye three times a day.    No medication comments found.    CURRENT ALLERGIES:  ALLERGIES  Allergen Reactions  Opioids-Meperidine * Other: See Comments  Causes vomiting  Sulfamethoxazole-Tr* GI Upset  Other reaction(s): Nausea and Vomiting    Hydrocodone-Acetami* Vomiting  Other reaction(s): Nausea and Vomiting      REVIEW OF SYSTEMS:  PAIN ASSESSMENT:  General: No weight loss, malaise or fevers.  Neuro: No Hx of stroke or seizures  Respiratory: No history of current cough or dyspnea, or pneumonia in the past 6 weeks. No history of respiratory/pulmonary symptoms or problems  Cardiovascular: Positive for: Hypertension  GI: No history of GI symptoms or problems. No history of esophageal varices, recent ascites, or ETOH greater than 2 drinks per day.  : No history of UTI in past 6 weeks. No history of  renal failure. Not currently on or requiring dialysis. No history of  symptoms or problems.  GYN: Negative for abnormal vaginal bleeding, abnormal vaginal discharge.  Pregnancy: Denies  Endocrine: No history of diabetes. Has not taken steroids within the past 30 days. No history of endocrinological symptoms or problems.  Hematology: No history of bleeding or clotting disorder. Pt is not taking anti-coagulation or platelet medications. No history of hematological symptoms or problems.  Oncology: No history of CA metastasis, chemo within 30 days, or radiotherapy within 90 days. Has not lost 10% of body wt in 6 months. No history of oncological symptoms or problems.  Psych: No history of psychiatric symptoms or problems.  Musculoskeletal: Negative for joint pain or swelling, back pain or muscle pain.  Skin: Negative for lesions, rash and itching.    PHYSICAL EXAM:  VITALS:  /69  Pulse 111        General: Alert and oriented  Skin: Normal color, no rash, no lesions.  HEENT: EOM, pupils equal, round and reactive.  Cardiovascular: Normal S1 & S2, no rubs, murmurs or gallops. No JVD. Pulse regular.  Lungs: Normal breath sounds, no wheezes or crackles.  Abdomen: Soft, non-tender, no rigidity.  Extremities: No deformity, no edema or tenderness, no joint swelling or clubbing.  Neurological: Normal cognition and motor skills.  Pulses: Carotid and radial pulses normal +2.    Diagnostic tests reviewed for today's visit:  NA    ASSESSMENT  Medication and Non-Pharmacologic VTE Prophylaxis/Anticoagulants      VTE Prophylaxis: NA    Impression: There is no known pertinent medical condition which may affect richard-operative course    Clinical Risk Factors for Possible Cardiac Complications:  None    Patient is scheduled for a low-risk procedure.    FUNCTIONAL STATUS: Walk indoors, such as around the house (1.75 METs)    Functional Class (NYHA): N/A    [employer]: NA2    PLAN  CONSULTS:  Patient does not require consults for  optimization at this time.    The Following Tests/Procedures Have Been Initiated:  None    Instructions Given to Patient:  Patient given verbal and written preop instructions and voices comprehension and compliance.    SIGNATURE: Abdirizak Laguerre MD PATIENT NAME: Nicole Jesus  DATE: October 30, 2023 MRN: [id number]  TIME: 9:51 AM PAGER/CONTACT #:    Electronically signed by Abdirizak Laguerre MD at 10/30/2023 10:06 AM EDT   Source Comments  - [facility]  In the event this information is protected by the Federal Confidentiality of Alcohol and Drug Abuse Patient Records regulations: This information has been disclosed to you from records protected by Federal confidentiality rules (42 CFR Part 2). The Federal rules prohibit you from making any further disclosure of this information unless further disclosure is expressly permitted by the written consent of the person to whom it pertains or as otherwise permitted by 42 CFR Part 2. A general authorization for the release of medical or other information is NOT sufficient for this purpose. The Federal rules restrict any use of the information to criminally investigate or prosecute any alcohol or drug abuse patient.  Reason for Visit    Reason for Visit -  Reason Comments   Blurred Vision Left Eye Continues   Difficulty Reading Left Eye continues   Halos Left Eye     Post-op Cataract OD 10/5/23     Encounter Details    Encounter Details  Date Type Department Care Team Description   10/30/2023 9:30 AM EDT Office Visit [facility] Ophthalmology   [address] [place] [address]   [phone]  Abdirizak Laguerre MD   [address] [place] [address]   [phone] (Work)   [phone] (Fax)  Combined form of age-related cataract, left eye (Primary Dx);  Benign lesion of eyelid of left eye;  Status post extracapsular cataract extraction of right eye;  Amblyopia of both eyes;  Primary hypertension;  Hyperlipidemia, unspecified hyperlipidemia type     Social History  -  documented as of this encounter  Social History  Tobacco Use Types Packs/Day Years Used Date   Smoking Tobacco: Every Day Cigarettes 1.5   Started: 01/06/1971   Smokeless Tobacco: Never             Social History  Area Deprivation Index Answer Date Recorded   National Score (1-100), lower number is lower risk 57     State Score (1-10), lower number is lower risk 3     Data from: https://[email or web address].[email or web address]/. Last address used for calculation [address] [address]       Social History  Sex and Gender Information Value Date Recorded   Sex Assigned at Birth Not on file     Gender Identity Not on file     Sexual Orientation Not on file       Last Filed Vital Signs  - documented in this encounter  Last Filed Vital Signs  Vital Sign Reading Time Taken Comments   Blood Pressure 148/69 10/30/2023 9:35 AM EDT     Pulse 111 10/30/2023 9:35 AM EDT     Temperature - -     Respiratory Rate - -     Oxygen Saturation - -     Inhaled Oxygen Concentration - -     Weight - -     Height - -     Body Mass Index - -       Patient Instructions  - documented in this encounter  Patient Instructions  Abdirizak Laguerre MD - 10/30/2023 9:45 AM EDT   Formatting of this note is different from the original.  Use eye medications as directed:  Current Ophthalmic Meds        fluorometholone (FML LIQUID FILM) 0.1 % ophthalmic suspension Use 1 Drop in the left eye three times a day.  prednisoLONE acetate (PRED FORTE) 1 % ophthalmic suspension Use 1 Drop in the right eye three times daily.  keTORolac (ACULAR) 0.5 % ophthalmic solution Use 1 Drop in the right eye three times daily.      Systane Complete Artificial Tears - Use 1 Drop into both eyes three times a day.    If you have any questions please contact our office at [phone].  After office hours or on the weekend, please call Dr. Laguerre on his cell phone at [phone].    Electronically signed by Abdirizak aLguerre MD at 10/30/2023 9:50 AM EDT     Ordered  Prescriptions  - documented in this encounterReconcile with Patient's Chart  Ordered Prescriptions  Prescription Sig Dispensed Refills Start Date End Date   prednisoLONE acetate (PRED FORTE) 1 % ophthalmic suspension  Use 1 Drop in the right eye three times a day. 5 mL  2 10/30/2023     keTORolac (ACULAR) 0.5 % ophthalmic solution  Use 1 Drop in the right eye three times a day. 5 mL  2 10/30/2023       Progress Notes  - documented in this encounter  Abdirizak Laguerre MD - 10/30/2023 9:45 AM EDT  Formatting of this note is different from the original.  ASSESSMENT/PLAN:    1. Combined form of age-related cataract, left eye - ICD9: 366.19, ICD10: H25.812 (primary diagnosis)  2. Benign lesion of eyelid left eye[H02.9]    Cataract Presurgical Documentation    Cataract: Left eye (OS)    Current Visual Acuity  Right Eye Distance SC 20/30  Left Eye Distance CC 20/40      Glare Testing:  Left Eye Medium 20/80  Left Eye High 20/100    Visual Function: Nicole Jesus states that the decline in vision from the cataract impedes her abilities as listed in the HPI, as well as other activities of daily living.    Nicole Jesus has confirmed that she is no longer able to function adequately on a day-to-day basis because of her current visual condition.    Further, it is my medical opinion that the cataract is the primary cause, or at least a significantly contributory cause of her visual dysfunction. With uncomplicated cataract surgery and lens implantation, it is my expectation that her visual function and quality of life will improve, significantly.    The risks, benefits, alternatives, personnel and complications of cataract surgery with lens implantation were discussed with Nicole Jesus in detail. she appeared to understand and asked that I proceed with plans for surgery.    PHYSICAL EXAM:    Vital Signs:  Blood pressure 148/69, pulse 111.    Respiratory:  Normal breath sounds, no wheezing.    CARD:  Normal heart sounds 1 &  2, normal sinus rhythm.    Continue:  FML eyedrops- 1 drop three times a day left eye    Continue:  Systane Complete Artificial Tears - Use 1 Drop into both eyes three times a day.    Upon eye examination, patient was found to have a visually significant cataract left eye. Discussed cataract surgery with patient and different intraocular lens implant options with patient: basic monofocal intraocular lens implant, Toric intraocular lens implant, and presbyopia correction intraocular lens implant. In my medical opinion, based on medical history and ocular examination, cataract surgery with intraocular lens implant will correct patient's vision and improve quality of patient's daily living activities. Patient wishes to have traditional cataract surgery with basic intraocular lens left eye and excision of benign skin lesion upper and lower eyelid on November 9, 2023 at [facility]. Patient wishes to have cataract surgery with the option stated above. Patient understands that an intraocular lens implant does not necessarily replace the need for glasses. Patient understands that it is impossible for the surgeon to inform him/her of every possible complication that may occur. The surgeon has answered all of the patient's questions. Patient understands that if he/she has a mature or dense cataract, pseudoexfoliation cataract, or history of use of Flomax, he/she may require the use of Maluyugin Ring and/or Vision Blue during surgery. Patient understands the risks, benefits, and alternatives to surgery.    3. Status post extracapsular cataract extraction of right eye - ICD9: V45.61, ICD10: Z98.41  -healing well.    Continue:  Current Ophthalmic Meds        prednisoLONE acetate (PRED FORTE) 1 % ophthalmic suspension Use 1 Drop in the right eye three times daily.  keTORolac (ACULAR) 0.5 % ophthalmic solution Use 1 Drop in the right eye three times daily.        4. Amblyopia of both eyes - ICD9:  368.00, ICD10: H53.003  -monitor    5. Primary hypertension - ICD9: 401.9, ICD10: I10  6. Hyperlipidemia, unspecified hyperlipidemia type - ICD9: 272.4, ICD10: E78.5  -continue care with PCP    I have confirmed and edited as necessary the relevant ophthalmic history, review of systems, surgical history, and ophthalmological examination findings as obtained by the ophthalmic technical staff. I have seen and examined Nicole Jesus. I have discussed the examination findings, diagnosis, and treatment options with Nicole Jesus and/or her family. I have also reviewed and agree with the assessment and plan as stated above and agree with all its relevant components. I gave the patient the opportunity to ask questions about the findings, diagnosis, and treatment options.    Abdirizak Laguerre MD    Electronically signed by Abdirizak Laguerre MD at 10/30/2023 10:06 AM EDT   Plan of Treatment  - documented as of this encounter  Plan of Treatment - Upcoming Encounters  Upcoming Encounters  Date Type Department Care Team Description   11/09/2023 9:05 AM EST Hospital Encounter   Abdirizak Laguerre MD   [address] [place] [address]   [phone] (Work)   [phone] (Fax)  Combined form of age-related cataract, left eye [H25.812]   11/09/2023 9:05 AM EST - 11/09/2023 9:20 AM EST Surgery [address] [address]  Abdirizak Laguerre MD   [address] [place] [address]   [phone] (Work)   [phone] (Fax)  SURGERY AT NON-[facility] FACILITY   11/10/2023 1:30 PM EST Office Visit [facility] Ophthalmology   [address] [place] [address]   [phone]  Abdirizak Laguerre MD   [address] [place] [address]   [phone] (Work)   [phone] (Fax)        Plan of Treatment - Scheduled Procedures  Scheduled Procedures  Name Priority Associated Diagnoses Date/Time   SURGERY AT NON[facility] FACILITY   Combined form of age-related cataract, left eye  11/09/2023 9:05 AM EST     Visit  Diagnoses  - documented in this encounter  Visit Diagnoses  Diagnosis   Combined form of age-related cataract, left eye - Primary    Benign lesion of eyelid of left eye   Unspecified disorder of eyelid    Status post extracapsular cataract extraction of right eye    Amblyopia of both eyes   Amblyopia, unspecified    Primary hypertension   Unspecified essential hypertension    Hyperlipidemia, unspecified hyperlipidemia type    Combined form of age-related cataract, left eye      Discontinued Medications  - documented as of this encounter  Discontinued Medications  Medication Sig Discontinue Reason Start Date End Date   prednisoLONE acetate (PRED FORTE) 1 % ophthalmic suspension  Use 1 Drop in the right eye four times daily.   10/06/2023 10/30/2023   keTORolac (ACULAR) 0.5 % ophthalmic solution  Use 1 Drop in the right eye four times daily.   10/06/2023 10/30/2023     Eye Exam    Eye Exam - Visual Acuity (Snellen - Linear)  Visual Acuity (Snellen - Linear)    Right eye Left eye   Dist sc 20/30     Dist cc   20/40 -1   Near cc   J7     Eye Exam - Tonometry (Applanation, 9:44 AM)  Tonometry (Applanation, 9:44 AM)    Right eye Left eye   Pressure 15 15     Eye Exam - Pupils  Pupils    Pupils Dark Light Shape APD   Right eye PERRL 4 2 Round None   Left eye PERRL 4 2 Round None     Eye Exam - Visual Fields (Counting fingers)  Visual Fields (Counting fingers)    Right eye Left eye     Full Full     Eye Exam - Extraocular Movement  Extraocular Movement    Right eye Left eye     Full Full     Eye Exam - Neuro/Psych  Neuro/Psych  Oriented x3: Yes   Mood/Affect: Normal     Eye Exam - Glare Testing  Glare Testing    Medium High   Right eye       Left eye 20/80 20/100     Eye Exam - External Exam  External Exam    Right eye Left eye   External Normal including orbits and preauricular lymph nodes Normal including orbits and preauricular lymph nodes     Eye Exam - Slit Lamp Exam  Slit Lamp Exam    Right eye Left eye   Lids/Lashes  Growth removal upper eyelid healing well Multiple skin lesion 3 upper lid, lesion left lateral canthus, ptosis3 mm in size each   Conjunctiva/Sclera White and quiet White and quiet   Cornea Normal epithelium, stroma, endothelium, and tear film Normal epithelium, stroma, endothelium, and tear film   Anterior Chamber Deep and quiet Deep and quiet   Iris Round and reactive Round and reactive   Lens Posterior chamber intraocular lens 3-4+ Nuclear sclerotic cataract, 3-4+ Posterior subcapsular cataract   Anterior Vitreous Vitreous floaters Vitreous floaters     Eye Exam - Fundus Exam  Fundus Exam    Right eye Left eye   Disc Normal Normal   C/D Ratio 0.2 0.2   Macula Normal Normal   Vessels Normal Normal   Periphery Normal Normal     Eye Exam - Wearing Rx  Wearing Rx    Sphere Cylinder Axis Add   Right eye           Left eye +2.50 +0.75 164 +2.00     Eye Exam - Manifest Refraction #1 (Auto)  Manifest Refraction #1 (Auto)    Sphere Cylinder Axis Dist VA   Right eye -0.50 +0.75 170     Left eye +3.00 +0.75 167       Eye Exam - Manifest Refraction #2  Manifest Refraction #2    Sphere Cylinder Axis Dist VA   Right eye -0.50 +0.75 170 20/30-   Left eye             Care Teams  - documented as of this encounter  Care Teams  Team Member Relationship Specialty Start Date End Date   Shimon Askew (Hist)   [place] [address]  PCP - General Family Medicine 9/6/23     Anam Rincon, OD   NPI: [id number]   [address]   [address] [address]   [phone] (Work)   [phone] (Fax)    Optometry 10/30/23

## 2023-11-09 NOTE — DISCHARGE INSTRUCTIONS
Please see enclosed instructions from Dr. Laguerre regarding eye drop schedule, restrictions and use of eye shield.    Please take bag with eye drops that were given to you today as well as ALL eye drops that you are using at home with you to your appointment tomorrow at Dr. Laguerre's office.     Please see enclosed sedation information

## 2023-11-09 NOTE — ANESTHESIA PREPROCEDURE EVALUATION
Patient: Nicole Jesus    Procedure Information       Date/Time: 11/09/23 1130    Procedure: Phacoemulsification Cataract with Insertion Intraocular Lens (Left: Eye)    Location: [facility] OR 05 / [facility] OR    Surgeons: Abdirizak Laguerre MD            Relevant Problems   Eye   (+) Combined forms of age-related cataract of left eye       Clinical information reviewed:   Tobacco  Allergies  Meds   Med Hx  Surg Hx   Fam Hx          NPO Detail:  NPO/Void Status  Date of Last Liquid: 11/08/23  Time of Last Liquid: 1800  Date of Last Solid: 11/08/23  Time of Last Solid: 1800         Physical Exam    Airway  Mallampati: II  TM distance: >3 FB  Neck ROM: full     Cardiovascular   Rhythm: regular  Rate: normal     Dental    Pulmonary   Breath sounds clear to auscultation     Abdominal            Anesthesia Plan    ASA 2     MAC     The patient is not a current smoker.  Patient was not previously instructed to abstain from smoking on day of procedure.  Patient did not smoke on day of procedure.    intravenous induction   Postoperative administration of opioids is intended.  Anesthetic plan and risks discussed with patient.

## 2023-11-09 NOTE — ANESTHESIA POSTPROCEDURE EVALUATION
Patient: Nicole Jesus    Procedure Summary       Date: 11/09/23 Room / Location: [facility] OR 05 / [facility]    Anesthesia Start: 1109 Anesthesia Stop: 1135    Procedure: Phacoemulsification Cataract with Insertion Intraocular Lens (Left: Eye) Diagnosis:       Combined forms of age-related cataract of left eye      (Combined forms of age-related cataract of left eye [H25.812])    Surgeons: Abdirizak Laguerre MD Responsible Provider: Dallas Rodriguez MD    Anesthesia Type: MAC ASA Status: 2            Anesthesia Type: MAC    Vitals Value Taken Time   Vitals:    11/09/23 1034   BP: 162/77   Pulse: 82   Resp: 12   Temp: 36.3 °C (97.3 °F)   SpO2: 98%       11/09/23 1135     11/09/23 1135     11/09/23 1135     11/09/23 1135     11/09/23 1135       Anesthesia Post Evaluation    Patient location during evaluation: bedside  Patient participation: complete - patient participated  Level of consciousness: sleepy but conscious  Pain management: adequate  Airway patency: patent  Cardiovascular status: acceptable  Respiratory status: acceptable      No notable events documented.

## 2023-11-09 NOTE — OP NOTE
Phacoemulsification Cataract with Insertion Intraocular Lens (L) Operative Note     Date: 2023  OR Location: [facility] [place] OR    Name: Nicole Jesus, : 1955, Age: 67 y.o., MRN: [id number], Sex: female    Diagnosis  Pre-op Diagnosis     * Combined forms of age-related cataract of left eye [H25.812] Benign Lesion left upper eyelid Post-op Diagnosis     * Combined forms of age-related cataract of left eye [H25.812] Benign Lesion Left Upper Eyelid     Procedures  Phacoemulsification Cataract with Insertion Intraocular Lens  56201 - PA XCAPSL CTRC RMVL INSJ IO LENS PROSTH W/O ECP  Excision of Benign Lesion Left Upper Eyelid - 3mm in size x 2    Surgeons      * Abdirizak Laguerre - Primary    Resident/Fellow/Other Assistant:  Surgeon(s) and Role:    Procedure Summary  Anesthesia: Monitor Anesthesia Care  ASA: II  Anesthesia Staff: Anesthesiologist: Navneet Castle MD; Dallas Rodriguez MD  Estimated Blood Loss: none  Intra-op Medications: * No intraprocedure medications in log *      Anesthesia Record        Intraprocedure I/O Totals       None      Specimen: No specimens collected     Staff:   Circulator: Lina More RN  Scrub Person: Alex Colindres RN    Drains and/or Catheters: * None in log *      Implants:  Implants         Findings: Combined Form Age Related Cataract Left Eye  Benign Lesion Left Upper Eyelid, 3mm in size x two    Indications: Nicole Jesus is an 67 y.o. female who is having surgery for Combined forms of age-related cataract of left eye [H25.812]. Patient is having difficulty seeing to read and watch TV.  Difficulty seeing to drive and complaints of glare.     The patient was seen in the preoperative area. The risks, benefits, complications, treatment options, non-operative alternatives, expected recovery and outcomes were discussed with the patient. The possibilities of reaction to medication, pulmonary aspiration, injury to surrounding structures, bleeding, recurrent infection, the need  for additional procedures, failure to diagnose a condition, and creating a complication requiring transfusion or operation were discussed with the patient. The patient concurred with the proposed plan, giving informed consent.  The site of surgery was properly noted/marked if necessary per policy. The patient has been actively warmed in preoperative area. Preoperative antibiotics are not indicated. Venous thrombosis prophylaxis are not indicated.    Procedure Details:   The patient was correctly identified in the preop area and the operative eye was marked with a marking pen. The operative eye was dilated in the preoperative area.  The patient was then taken to the operating room where timeout was performed before starting the procedure. Combined anesthesia  with intravenous sedation and topical tetracaine eyedrops were given the left eye. The operative eye was prepped and draped in the standard sterile ophthalmic fashion in  preparation for ophthalmic surgery.  A Jesus wire speculum was then inserted between the eyelids of the left eye and the operating microscope was placed over the left eye.  A paracentesis incision was made approximately 30° away from the planned surgical incision site with the help of MVR blade.  1% lidocaine MPF with Phenylephrine 1.5% PF was injected into the anterior chamber through the paracentesis incision. A near limbal clear corneal incision was fashioned in the temporal quadrant just outside the vascular arcade and Viscoat was injected into anterior chamber to firm the eye. A bent needle cystotome was used and Utrata forceps were utilized to create a continuous curvilinear capsulorrhexis.  BSS was injected beneath the anterior capsule to hydrodissect the nucleus from adjacent cortex and capsule.  The residual cortex were then aspirated with irrigation aspiration handpiece. The posterior capsule was then polished with the help of soft irrigation-aspiration tip.  Provisc viscoelastic  was then injected into the eye to reform the anterior chamber and to open the capsular bag.  The intraocular lens implant was taken from its sterile wrapping, inspected under the surgical microscope and found to be in good condition. The intraocular lens implant 25.5D was injected into the capsule bag. The Provisc was then aspirated from the anterior chamber and from behind the intraocular lens implant.  The anterior chamber was inflated with the help of BSS to moderate tension.  The edges of the surgical incision were then hydrated with the help of BSS.  Vigamox was then injected into the anterior chamber and into the capsule bag through the paracentesis incision. The surgical wound was then inspected and found to be watertight.  The wire speculum and drapes were then removed.  Pred Forte eyedrops, Acular eyedrops and Betadine 5% sterile ophthalmic solution were instilled in the conjunctival sac.     Attention was then given to the skin lesion left upper eyelid.  Lidocaine with epinephrine 1% was injected into the surgical site.  Two 3mm benign lesions were excised using blunt/sharp dissection.  Wetfield cautery was used and ophthalmic ointment was applied.      The patient tolerated the procedure well and was taken to recovery room in stable condition.      Complications:  None; patient tolerated the procedure well.    Disposition:  Home  Condition: stable       Attending Attestation: I performed the procedure.    Abdirizak Laguerre  Phone Number: [phone]

## 2023-12-01 DIAGNOSIS — I10 ESSENTIAL (PRIMARY) HYPERTENSION: Primary | ICD-10-CM

## 2023-12-01 DIAGNOSIS — E78.2 MIXED HYPERLIPIDEMIA: ICD-10-CM

## 2023-12-11 ENCOUNTER — LAB (OUTPATIENT)
Dept: LAB | Facility: LAB | Age: 68
End: 2023-12-11
Payer: MEDICARE

## 2023-12-11 DIAGNOSIS — E78.2 MIXED HYPERLIPIDEMIA: ICD-10-CM

## 2023-12-11 DIAGNOSIS — I10 ESSENTIAL (PRIMARY) HYPERTENSION: ICD-10-CM

## 2023-12-11 LAB
ALBUMIN SERPL BCP-MCNC: 4.2 G/DL (ref 3.4–5)
ALP SERPL-CCNC: 102 U/L (ref 33–136)
ALT SERPL W P-5'-P-CCNC: 25 U/L (ref 7–45)
ANION GAP SERPL CALC-SCNC: 9 MMOL/L (ref 10–20)
AST SERPL W P-5'-P-CCNC: 22 U/L (ref 9–39)
BASOPHILS # BLD AUTO: 0.05 X10*3/UL (ref 0–0.1)
BASOPHILS NFR BLD AUTO: 0.5 %
BILIRUB SERPL-MCNC: 0.5 MG/DL (ref 0–1.2)
BUN SERPL-MCNC: 18 MG/DL (ref 6–23)
CALCIUM SERPL-MCNC: 10.5 MG/DL (ref 8.6–10.3)
CHLORIDE SERPL-SCNC: 99 MMOL/L (ref 98–107)
CHOLEST SERPL-MCNC: 189 MG/DL (ref 0–199)
CHOLESTEROL/HDL RATIO: 2.3
CO2 SERPL-SCNC: 30 MMOL/L (ref 21–32)
CREAT SERPL-MCNC: 0.7 MG/DL (ref 0.5–1.05)
EOSINOPHIL # BLD AUTO: 0.12 X10*3/UL (ref 0–0.7)
EOSINOPHIL NFR BLD AUTO: 1.2 %
ERYTHROCYTE [DISTWIDTH] IN BLOOD BY AUTOMATED COUNT: 14.4 % (ref 11.5–14.5)
GFR SERPL CREATININE-BSD FRML MDRD: >90 ML/MIN/1.73M*2
GLUCOSE SERPL-MCNC: 85 MG/DL (ref 74–99)
HCT VFR BLD AUTO: 43.5 % (ref 36–46)
HDLC SERPL-MCNC: 83 MG/DL
HGB BLD-MCNC: 14.1 G/DL (ref 12–16)
IMM GRANULOCYTES # BLD AUTO: 0.04 X10*3/UL (ref 0–0.7)
IMM GRANULOCYTES NFR BLD AUTO: 0.4 % (ref 0–0.9)
LDLC SERPL CALC-MCNC: 93 MG/DL
LYMPHOCYTES # BLD AUTO: 3.04 X10*3/UL (ref 1.2–4.8)
LYMPHOCYTES NFR BLD AUTO: 31.6 %
MCH RBC QN AUTO: 28.8 PG (ref 26–34)
MCHC RBC AUTO-ENTMCNC: 32.4 G/DL (ref 32–36)
MCV RBC AUTO: 89 FL (ref 80–100)
MONOCYTES # BLD AUTO: 0.95 X10*3/UL (ref 0.1–1)
MONOCYTES NFR BLD AUTO: 9.9 %
NEUTROPHILS # BLD AUTO: 5.43 X10*3/UL (ref 1.2–7.7)
NEUTROPHILS NFR BLD AUTO: 56.4 %
NON HDL CHOLESTEROL: 106 MG/DL (ref 0–149)
NRBC BLD-RTO: 0 /100 WBCS (ref 0–0)
PLATELET # BLD AUTO: 318 X10*3/UL (ref 150–450)
POTASSIUM SERPL-SCNC: 4.3 MMOL/L (ref 3.5–5.3)
PROT SERPL-MCNC: 6.9 G/DL (ref 6.4–8.2)
RBC # BLD AUTO: 4.9 X10*6/UL (ref 4–5.2)
SODIUM SERPL-SCNC: 134 MMOL/L (ref 136–145)
TRIGL SERPL-MCNC: 65 MG/DL (ref 0–149)
VLDL: 13 MG/DL (ref 0–40)
WBC # BLD AUTO: 9.6 X10*3/UL (ref 4.4–11.3)

## 2023-12-11 PROCEDURE — 80061 LIPID PANEL: CPT

## 2023-12-11 PROCEDURE — 85025 COMPLETE CBC W/AUTO DIFF WBC: CPT

## 2023-12-11 PROCEDURE — 36415 COLL VENOUS BLD VENIPUNCTURE: CPT

## 2023-12-11 PROCEDURE — 80053 COMPREHEN METABOLIC PANEL: CPT

## 2024-03-27 ENCOUNTER — APPOINTMENT (OUTPATIENT)
Dept: CARDIOLOGY | Facility: HOSPITAL | Age: 69
DRG: 193 | End: 2024-03-27
Payer: MEDICARE

## 2024-03-27 ENCOUNTER — HOSPITAL ENCOUNTER (INPATIENT)
Facility: HOSPITAL | Age: 69
LOS: 2 days | Discharge: HOME | DRG: 193 | End: 2024-03-29
Attending: EMERGENCY MEDICINE
Payer: MEDICARE

## 2024-03-27 ENCOUNTER — APPOINTMENT (OUTPATIENT)
Dept: RADIOLOGY | Facility: HOSPITAL | Age: 69
DRG: 193 | End: 2024-03-27
Payer: MEDICARE

## 2024-03-27 DIAGNOSIS — J11.1 INFLUENZA: Primary | ICD-10-CM

## 2024-03-27 DIAGNOSIS — R79.89 ELEVATED TROPONIN: ICD-10-CM

## 2024-03-27 DIAGNOSIS — J96.01 ACUTE HYPOXIC RESPIRATORY FAILURE (MULTI): ICD-10-CM

## 2024-03-27 DIAGNOSIS — I70.0 AORTIC ATHEROSCLEROSIS (CMS-HCC): ICD-10-CM

## 2024-03-27 LAB
ANION GAP SERPL CALC-SCNC: 15 MMOL/L (ref 10–20)
BASOPHILS # BLD AUTO: 0.03 X10*3/UL (ref 0–0.1)
BASOPHILS NFR BLD AUTO: 0.3 %
BNP SERPL-MCNC: 74 PG/ML (ref 0–99)
BUN SERPL-MCNC: 10 MG/DL (ref 6–23)
CALCIUM SERPL-MCNC: 9.6 MG/DL (ref 8.6–10.3)
CARDIAC TROPONIN I PNL SERPL HS: 105 NG/L (ref 0–13)
CARDIAC TROPONIN I PNL SERPL HS: 135 NG/L (ref 0–13)
CARDIAC TROPONIN I PNL SERPL HS: 141 NG/L (ref 0–13)
CHLORIDE SERPL-SCNC: 96 MMOL/L (ref 98–107)
CO2 SERPL-SCNC: 23 MMOL/L (ref 21–32)
CREAT SERPL-MCNC: 0.76 MG/DL (ref 0.5–1.05)
EGFRCR SERPLBLD CKD-EPI 2021: 86 ML/MIN/1.73M*2
EOSINOPHIL # BLD AUTO: 0.06 X10*3/UL (ref 0–0.7)
EOSINOPHIL NFR BLD AUTO: 0.5 %
ERYTHROCYTE [DISTWIDTH] IN BLOOD BY AUTOMATED COUNT: 15.8 % (ref 11.5–14.5)
FLUAV RNA RESP QL NAA+PROBE: DETECTED
FLUBV RNA RESP QL NAA+PROBE: NOT DETECTED
GLUCOSE SERPL-MCNC: 100 MG/DL (ref 74–99)
HCT VFR BLD AUTO: 42.4 % (ref 36–46)
HGB BLD-MCNC: 14.3 G/DL (ref 12–16)
IMM GRANULOCYTES # BLD AUTO: 0.04 X10*3/UL (ref 0–0.7)
IMM GRANULOCYTES NFR BLD AUTO: 0.3 % (ref 0–0.9)
LYMPHOCYTES # BLD AUTO: 1.82 X10*3/UL (ref 1.2–4.8)
LYMPHOCYTES NFR BLD AUTO: 15.4 %
MCH RBC QN AUTO: 29.1 PG (ref 26–34)
MCHC RBC AUTO-ENTMCNC: 33.7 G/DL (ref 32–36)
MCV RBC AUTO: 86 FL (ref 80–100)
MONOCYTES # BLD AUTO: 1.23 X10*3/UL (ref 0.1–1)
MONOCYTES NFR BLD AUTO: 10.4 %
NEUTROPHILS # BLD AUTO: 8.61 X10*3/UL (ref 1.2–7.7)
NEUTROPHILS NFR BLD AUTO: 73.1 %
NRBC BLD-RTO: 0 /100 WBCS (ref 0–0)
PLATELET # BLD AUTO: 209 X10*3/UL (ref 150–450)
POTASSIUM SERPL-SCNC: 3.6 MMOL/L (ref 3.5–5.3)
RBC # BLD AUTO: 4.92 X10*6/UL (ref 4–5.2)
RSV RNA RESP QL NAA+PROBE: NOT DETECTED
SARS-COV-2 RNA RESP QL NAA+PROBE: NOT DETECTED
SODIUM SERPL-SCNC: 130 MMOL/L (ref 136–145)
WBC # BLD AUTO: 11.8 X10*3/UL (ref 4.4–11.3)

## 2024-03-27 PROCEDURE — 87637 SARSCOV2&INF A&B&RSV AMP PRB: CPT | Performed by: EMERGENCY MEDICINE

## 2024-03-27 PROCEDURE — 80048 BASIC METABOLIC PNL TOTAL CA: CPT | Performed by: EMERGENCY MEDICINE

## 2024-03-27 PROCEDURE — 99285 EMERGENCY DEPT VISIT HI MDM: CPT | Mod: 25

## 2024-03-27 PROCEDURE — 71045 X-RAY EXAM CHEST 1 VIEW: CPT

## 2024-03-27 PROCEDURE — 96375 TX/PRO/DX INJ NEW DRUG ADDON: CPT

## 2024-03-27 PROCEDURE — 2500000001 HC RX 250 WO HCPCS SELF ADMINISTERED DRUGS (ALT 637 FOR MEDICARE OP): Performed by: EMERGENCY MEDICINE

## 2024-03-27 PROCEDURE — 1100000001 HC PRIVATE ROOM DAILY

## 2024-03-27 PROCEDURE — 2500000002 HC RX 250 W HCPCS SELF ADMINISTERED DRUGS (ALT 637 FOR MEDICARE OP, ALT 636 FOR OP/ED)

## 2024-03-27 PROCEDURE — 96361 HYDRATE IV INFUSION ADD-ON: CPT

## 2024-03-27 PROCEDURE — 93005 ELECTROCARDIOGRAM TRACING: CPT

## 2024-03-27 PROCEDURE — 99222 1ST HOSP IP/OBS MODERATE 55: CPT | Performed by: STUDENT IN AN ORGANIZED HEALTH CARE EDUCATION/TRAINING PROGRAM

## 2024-03-27 PROCEDURE — 71045 X-RAY EXAM CHEST 1 VIEW: CPT | Performed by: RADIOLOGY

## 2024-03-27 PROCEDURE — 85025 COMPLETE CBC W/AUTO DIFF WBC: CPT | Performed by: EMERGENCY MEDICINE

## 2024-03-27 PROCEDURE — 2500000004 HC RX 250 GENERAL PHARMACY W/ HCPCS (ALT 636 FOR OP/ED): Performed by: EMERGENCY MEDICINE

## 2024-03-27 PROCEDURE — 83880 ASSAY OF NATRIURETIC PEPTIDE: CPT | Performed by: EMERGENCY MEDICINE

## 2024-03-27 PROCEDURE — 84484 ASSAY OF TROPONIN QUANT: CPT | Performed by: EMERGENCY MEDICINE

## 2024-03-27 PROCEDURE — 99222 1ST HOSP IP/OBS MODERATE 55: CPT

## 2024-03-27 PROCEDURE — 2500000005 HC RX 250 GENERAL PHARMACY W/O HCPCS

## 2024-03-27 PROCEDURE — 2500000005 HC RX 250 GENERAL PHARMACY W/O HCPCS: Performed by: EMERGENCY MEDICINE

## 2024-03-27 PROCEDURE — 36415 COLL VENOUS BLD VENIPUNCTURE: CPT | Performed by: EMERGENCY MEDICINE

## 2024-03-27 PROCEDURE — 96365 THER/PROPH/DIAG IV INF INIT: CPT

## 2024-03-27 PROCEDURE — 94762 N-INVAS EAR/PLS OXIMTRY CONT: CPT

## 2024-03-27 PROCEDURE — 2500000004 HC RX 250 GENERAL PHARMACY W/ HCPCS (ALT 636 FOR OP/ED)

## 2024-03-27 PROCEDURE — 84484 ASSAY OF TROPONIN QUANT: CPT

## 2024-03-27 RX ORDER — IPRATROPIUM BROMIDE AND ALBUTEROL SULFATE 2.5; .5 MG/3ML; MG/3ML
3 SOLUTION RESPIRATORY (INHALATION) EVERY 6 HOURS PRN
Status: DISCONTINUED | OUTPATIENT
Start: 2024-03-27 | End: 2024-03-29 | Stop reason: HOSPADM

## 2024-03-27 RX ORDER — AZITHROMYCIN 250 MG/1
500 TABLET, FILM COATED ORAL
Status: DISCONTINUED | OUTPATIENT
Start: 2024-03-27 | End: 2024-03-29

## 2024-03-27 RX ORDER — ONDANSETRON HYDROCHLORIDE 2 MG/ML
4 INJECTION, SOLUTION INTRAVENOUS EVERY 8 HOURS PRN
Status: DISCONTINUED | OUTPATIENT
Start: 2024-03-27 | End: 2024-03-29 | Stop reason: HOSPADM

## 2024-03-27 RX ORDER — PREDNISONE 20 MG/1
40 TABLET ORAL DAILY
Status: DISCONTINUED | OUTPATIENT
Start: 2024-03-28 | End: 2024-03-29 | Stop reason: HOSPADM

## 2024-03-27 RX ORDER — ASPIRIN 325 MG
325 TABLET ORAL ONCE
Status: COMPLETED | OUTPATIENT
Start: 2024-03-27 | End: 2024-03-27

## 2024-03-27 RX ORDER — SODIUM CHLORIDE 9 MG/ML
125 INJECTION, SOLUTION INTRAVENOUS CONTINUOUS
Status: DISCONTINUED | OUTPATIENT
Start: 2024-03-27 | End: 2024-03-28

## 2024-03-27 RX ORDER — ACETAMINOPHEN 325 MG/1
650 TABLET ORAL ONCE
Status: COMPLETED | OUTPATIENT
Start: 2024-03-27 | End: 2024-03-27

## 2024-03-27 RX ORDER — IBUPROFEN 200 MG
1 TABLET ORAL DAILY
Status: DISCONTINUED | OUTPATIENT
Start: 2024-03-27 | End: 2024-03-29 | Stop reason: HOSPADM

## 2024-03-27 RX ORDER — LEVOFLOXACIN 5 MG/ML
500 INJECTION, SOLUTION INTRAVENOUS ONCE
Status: COMPLETED | OUTPATIENT
Start: 2024-03-27 | End: 2024-03-27

## 2024-03-27 RX ORDER — OMEPRAZOLE 20 MG/1
20 TABLET, DELAYED RELEASE ORAL
COMMUNITY

## 2024-03-27 RX ORDER — ACETAMINOPHEN 325 MG/1
650 TABLET ORAL EVERY 4 HOURS PRN
Status: DISCONTINUED | OUTPATIENT
Start: 2024-03-27 | End: 2024-03-29 | Stop reason: HOSPADM

## 2024-03-27 RX ORDER — GUAIFENESIN 100 MG/5ML
200 SOLUTION ORAL EVERY 4 HOURS PRN
Status: DISCONTINUED | OUTPATIENT
Start: 2024-03-27 | End: 2024-03-29 | Stop reason: HOSPADM

## 2024-03-27 RX ORDER — CALCIUM CARBONATE/VITAMIN D3 600 MG-20
1 TABLET,CHEWABLE ORAL DAILY
COMMUNITY

## 2024-03-27 RX ORDER — KETOROLAC TROMETHAMINE 30 MG/ML
15 INJECTION, SOLUTION INTRAMUSCULAR; INTRAVENOUS ONCE
Status: COMPLETED | OUTPATIENT
Start: 2024-03-27 | End: 2024-03-27

## 2024-03-27 RX ORDER — OSELTAMIVIR PHOSPHATE 75 MG/1
75 CAPSULE ORAL 2 TIMES DAILY
Status: DISCONTINUED | OUTPATIENT
Start: 2024-03-27 | End: 2024-03-29 | Stop reason: HOSPADM

## 2024-03-27 RX ORDER — ONDANSETRON 4 MG/1
4 TABLET, ORALLY DISINTEGRATING ORAL EVERY 8 HOURS PRN
Status: DISCONTINUED | OUTPATIENT
Start: 2024-03-27 | End: 2024-03-29 | Stop reason: HOSPADM

## 2024-03-27 RX ORDER — POLYETHYLENE GLYCOL 3350 17 G/17G
17 POWDER, FOR SOLUTION ORAL DAILY
Status: DISCONTINUED | OUTPATIENT
Start: 2024-03-27 | End: 2024-03-29 | Stop reason: HOSPADM

## 2024-03-27 RX ORDER — ENOXAPARIN SODIUM 100 MG/ML
40 INJECTION SUBCUTANEOUS EVERY 24 HOURS
Status: DISCONTINUED | OUTPATIENT
Start: 2024-03-27 | End: 2024-03-29 | Stop reason: HOSPADM

## 2024-03-27 RX ADMIN — Medication 2 L/MIN: at 17:20

## 2024-03-27 RX ADMIN — SODIUM CHLORIDE 1000 ML: 9 INJECTION, SOLUTION INTRAVENOUS at 15:37

## 2024-03-27 RX ADMIN — AZITHROMYCIN DIHYDRATE 500 MG: 250 TABLET ORAL at 21:14

## 2024-03-27 RX ADMIN — ASPIRIN 325 MG: 325 TABLET ORAL at 14:24

## 2024-03-27 RX ADMIN — Medication 2 L/MIN: at 13:30

## 2024-03-27 RX ADMIN — ENOXAPARIN SODIUM 40 MG: 40 INJECTION SUBCUTANEOUS at 21:14

## 2024-03-27 RX ADMIN — SODIUM CHLORIDE 125 ML/HR: 9 INJECTION, SOLUTION INTRAVENOUS at 17:20

## 2024-03-27 RX ADMIN — KETOROLAC TROMETHAMINE 15 MG: 30 INJECTION, SOLUTION INTRAMUSCULAR at 13:37

## 2024-03-27 RX ADMIN — SODIUM CHLORIDE 1000 ML: 9 INJECTION, SOLUTION INTRAVENOUS at 17:20

## 2024-03-27 RX ADMIN — ACETAMINOPHEN 650 MG: 325 TABLET ORAL at 13:36

## 2024-03-27 RX ADMIN — Medication 2 L/MIN: at 13:15

## 2024-03-27 RX ADMIN — OSELTAMAVIR PHOSPHATE 75 MG: 75 CAPSULE ORAL at 21:14

## 2024-03-27 RX ADMIN — LEVOFLOXACIN 500 MG: 500 INJECTION, SOLUTION INTRAVENOUS at 14:24

## 2024-03-27 RX ADMIN — SODIUM CHLORIDE 1000 ML: 9 INJECTION, SOLUTION INTRAVENOUS at 13:36

## 2024-03-27 ASSESSMENT — COLUMBIA-SUICIDE SEVERITY RATING SCALE - C-SSRS
6. HAVE YOU EVER DONE ANYTHING, STARTED TO DO ANYTHING, OR PREPARED TO DO ANYTHING TO END YOUR LIFE?: NO
1. IN THE PAST MONTH, HAVE YOU WISHED YOU WERE DEAD OR WISHED YOU COULD GO TO SLEEP AND NOT WAKE UP?: NO
2. HAVE YOU ACTUALLY HAD ANY THOUGHTS OF KILLING YOURSELF?: NO

## 2024-03-27 ASSESSMENT — COGNITIVE AND FUNCTIONAL STATUS - GENERAL
MOBILITY SCORE: 24
DAILY ACTIVITIY SCORE: 24
PATIENT BASELINE BEDBOUND: NO

## 2024-03-27 ASSESSMENT — ENCOUNTER SYMPTOMS
ENDOCRINE NEGATIVE: 1
MUSCULOSKELETAL NEGATIVE: 1
FATIGUE: 1
RESPIRATORY NEGATIVE: 1
ABDOMINAL PAIN: 1
CARDIOVASCULAR NEGATIVE: 1
NERVOUS/ANXIOUS: 1

## 2024-03-27 ASSESSMENT — ACTIVITIES OF DAILY LIVING (ADL)
GROOMING: INDEPENDENT
ADEQUATE_TO_COMPLETE_ADL: YES
JUDGMENT_ADEQUATE_SAFELY_COMPLETE_DAILY_ACTIVITIES: YES
PATIENT'S MEMORY ADEQUATE TO SAFELY COMPLETE DAILY ACTIVITIES?: YES
HEARING - LEFT EAR: FUNCTIONAL
ASSISTIVE_DEVICE: DENTURES UPPER
HEARING - RIGHT EAR: FUNCTIONAL
TOILETING: INDEPENDENT
DRESSING YOURSELF: INDEPENDENT
LACK_OF_TRANSPORTATION: NO
FEEDING YOURSELF: INDEPENDENT
BATHING: INDEPENDENT
WALKS IN HOME: INDEPENDENT

## 2024-03-27 ASSESSMENT — PAIN SCALES - GENERAL
PAINLEVEL_OUTOF10: 5 - MODERATE PAIN
PAINLEVEL_OUTOF10: 0 - NO PAIN

## 2024-03-27 ASSESSMENT — PAIN DESCRIPTION - LOCATION: LOCATION: OTHER (COMMENT)

## 2024-03-27 ASSESSMENT — PAIN - FUNCTIONAL ASSESSMENT
PAIN_FUNCTIONAL_ASSESSMENT: 0-10
PAIN_FUNCTIONAL_ASSESSMENT: 0-10

## 2024-03-27 NOTE — ED PROVIDER NOTES
HPI   Chief Complaint   Patient presents with    Shortness of Breath     Patient to ED via squad reference difficulty breathing with nausea/vomiting/diarrhea and fever x 3 days. Duo neb and solumedrol given via squad. 86 on room air initially.        Patient presents to the emergency department secondary to shortness of breath, cough, fever, nausea, vomiting, and diarrhea.  Patient is a pack and a half a day smoker but does not have a formal diagnosis of COPD.  She was given a DuoNeb nebulizer as well as IV Solu-Medrol and route to the hospital.                          No data recorded                   Patient History   Past Medical History:   Diagnosis Date    Hypertension      Past Surgical History:   Procedure Laterality Date    ABDOMINAL SURGERY      BREAST SURGERY      CATARACT EXTRACTION      COSMETIC SURGERY      HYSTERECTOMY      PANCREAS SURGERY       No family history on file.  Social History     Tobacco Use    Smoking status: Every Day     Packs/day: 2.00     Years: 50.00     Additional pack years: 0.00     Total pack years: 100.00     Types: Cigarettes    Smokeless tobacco: Never   Substance Use Topics    Alcohol use: Never    Drug use: Never       Physical Exam   ED Triage Vitals [03/27/24 1318]   Temperature Heart Rate Respirations BP   (!) 39.3 °C (102.8 °F) (!) 132 (!) 44 128/84      Pulse Ox Temp Source Heart Rate Source Patient Position   (!) 88 % Temporal Monitor Lying      BP Location FiO2 (%)     Left arm --       Physical Exam  Vitals and nursing note reviewed.   Constitutional:       General: She is not in acute distress.     Appearance: Normal appearance. She is normal weight. She is not ill-appearing, toxic-appearing or diaphoretic.      Comments: Appears chronically debilitated.  Slightly dyspneic at rest but able to speak in full sentences.   HENT:      Head: Normocephalic and atraumatic.      Nose: Nose normal. No rhinorrhea.   Neck:      Comments: Trachea is midline  Cardiovascular:       Rate and Rhythm: Regular rhythm. Tachycardia present.      Heart sounds: No murmur heard.  Pulmonary:      Effort: Pulmonary effort is normal.      Breath sounds: Examination of the right-upper field reveals decreased breath sounds. Examination of the left-upper field reveals decreased breath sounds. Examination of the right-middle field reveals decreased breath sounds. Examination of the left-middle field reveals decreased breath sounds. Examination of the right-lower field reveals decreased breath sounds, wheezing and rhonchi. Examination of the left-lower field reveals decreased breath sounds, wheezing and rhonchi. Decreased breath sounds, wheezing and rhonchi present. No rales.   Abdominal:      General: Abdomen is flat. Bowel sounds are normal. There is no distension.      Palpations: Abdomen is soft.      Tenderness: There is no abdominal tenderness.   Musculoskeletal:         General: Normal range of motion.      Cervical back: Normal range of motion.   Skin:     General: Skin is warm and dry.      Findings: No rash.   Neurological:      General: No focal deficit present.      Mental Status: She is alert and oriented to person, place, and time. Mental status is at baseline.   Psychiatric:         Mood and Affect: Mood normal.         Behavior: Behavior normal.         Thought Content: Thought content normal.         Judgment: Judgment normal.         ED Course & MDM   Diagnoses as of 03/27/24 1511   Influenza       Medical Decision Making  Twelve-lead EKG was interpreted by myself and this was noted to contribute directly to patient care.  Study reveals a sinus tachycardia at 136 bpm, normal axis, delayed R wave progression, no acute ischemic changes.    Based on the patient's chest x-ray findings she was given IV antibiotics.  She was medicated for fever and given IV fluids as well.  Patient's troponin is elevated but stable.  This is likely due to demand ischemia from hypoxia.  Patient appears clinically  much improved since her initial arrival.  Given her oxygen demands I feel she needs to be admitted and her troponin needs to be monitored as well.  Patient case was discussed with the hospitalist who agrees and will admit to their service    Critical care time for this patient excluding billable procedures is 46 minutes secondary to multiple repeat physical examinations, chart review, and interpretation of radiographic studies.        Procedure  Procedures     Zackary Ward, DO  03/27/24 1514

## 2024-03-28 ENCOUNTER — APPOINTMENT (OUTPATIENT)
Dept: CARDIOLOGY | Facility: HOSPITAL | Age: 69
DRG: 193 | End: 2024-03-28
Payer: MEDICARE

## 2024-03-28 PROBLEM — J96.01 ACUTE HYPOXIC RESPIRATORY FAILURE (MULTI): Status: ACTIVE | Noted: 2024-03-28

## 2024-03-28 PROBLEM — Z72.0 CONTINUOUS TOBACCO ABUSE: Chronic | Status: ACTIVE | Noted: 2024-03-28

## 2024-03-28 LAB
ALBUMIN SERPL BCP-MCNC: 3.1 G/DL (ref 3.4–5)
ALP SERPL-CCNC: 62 U/L (ref 33–136)
ALT SERPL W P-5'-P-CCNC: 26 U/L (ref 7–45)
ANION GAP SERPL CALC-SCNC: 9 MMOL/L (ref 10–20)
AORTIC VALVE MEAN GRADIENT: 6 MMHG
AORTIC VALVE PEAK VELOCITY: 1.82 M/S
AST SERPL W P-5'-P-CCNC: 32 U/L (ref 9–39)
AV PEAK GRADIENT: 13.2 MMHG
AVA (PEAK VEL): 2.04 CM2
AVA (VTI): 2.38 CM2
BILIRUB SERPL-MCNC: 0.2 MG/DL (ref 0–1.2)
BUN SERPL-MCNC: 13 MG/DL (ref 6–23)
CALCIUM SERPL-MCNC: 8.4 MG/DL (ref 8.6–10.3)
CARDIAC TROPONIN I PNL SERPL HS: 50 NG/L (ref 0–13)
CHLORIDE SERPL-SCNC: 111 MMOL/L (ref 98–107)
CHOLEST SERPL-MCNC: 129 MG/DL (ref 0–199)
CHOLESTEROL/HDL RATIO: 2.3
CO2 SERPL-SCNC: 21 MMOL/L (ref 21–32)
CREAT SERPL-MCNC: 0.43 MG/DL (ref 0.5–1.05)
EGFRCR SERPLBLD CKD-EPI 2021: >90 ML/MIN/1.73M*2
EJECTION FRACTION APICAL 4 CHAMBER: 58.5
EJECTION FRACTION: 59 %
GLUCOSE SERPL-MCNC: 118 MG/DL (ref 74–99)
HDLC SERPL-MCNC: 56 MG/DL
HOLD SPECIMEN: NORMAL
LDLC SERPL CALC-MCNC: 57 MG/DL
LEFT ATRIUM VOLUME AREA LENGTH INDEX BSA: 15.6 ML/M2
LEFT VENTRICLE INTERNAL DIMENSION DIASTOLE: 4.06 CM (ref 3.5–6)
LEFT VENTRICULAR OUTFLOW TRACT DIAMETER: 1.9 CM
NON HDL CHOLESTEROL: 73 MG/DL (ref 0–149)
POTASSIUM SERPL-SCNC: 3.9 MMOL/L (ref 3.5–5.3)
PROT SERPL-MCNC: 5.3 G/DL (ref 6.4–8.2)
SODIUM SERPL-SCNC: 137 MMOL/L (ref 136–145)
TRIGL SERPL-MCNC: 79 MG/DL (ref 0–149)
VLDL: 16 MG/DL (ref 0–40)

## 2024-03-28 PROCEDURE — 93306 TTE W/DOPPLER COMPLETE: CPT | Performed by: STUDENT IN AN ORGANIZED HEALTH CARE EDUCATION/TRAINING PROGRAM

## 2024-03-28 PROCEDURE — 2500000004 HC RX 250 GENERAL PHARMACY W/ HCPCS (ALT 636 FOR OP/ED)

## 2024-03-28 PROCEDURE — 84484 ASSAY OF TROPONIN QUANT: CPT

## 2024-03-28 PROCEDURE — 99232 SBSQ HOSP IP/OBS MODERATE 35: CPT

## 2024-03-28 PROCEDURE — 2500000002 HC RX 250 W HCPCS SELF ADMINISTERED DRUGS (ALT 637 FOR MEDICARE OP, ALT 636 FOR OP/ED)

## 2024-03-28 PROCEDURE — 94640 AIRWAY INHALATION TREATMENT: CPT

## 2024-03-28 PROCEDURE — 94761 N-INVAS EAR/PLS OXIMETRY MLT: CPT

## 2024-03-28 PROCEDURE — 83718 ASSAY OF LIPOPROTEIN: CPT

## 2024-03-28 PROCEDURE — 2500000004 HC RX 250 GENERAL PHARMACY W/ HCPCS (ALT 636 FOR OP/ED): Performed by: HOSPITALIST

## 2024-03-28 PROCEDURE — 9420000001 HC RT PATIENT EDUCATION 5 MIN

## 2024-03-28 PROCEDURE — 36415 COLL VENOUS BLD VENIPUNCTURE: CPT

## 2024-03-28 PROCEDURE — 1200000002 HC GENERAL ROOM WITH TELEMETRY DAILY

## 2024-03-28 PROCEDURE — 93306 TTE W/DOPPLER COMPLETE: CPT

## 2024-03-28 PROCEDURE — 2500000001 HC RX 250 WO HCPCS SELF ADMINISTERED DRUGS (ALT 637 FOR MEDICARE OP)

## 2024-03-28 PROCEDURE — 84075 ASSAY ALKALINE PHOSPHATASE: CPT

## 2024-03-28 RX ORDER — TIZANIDINE 2 MG/1
4 TABLET ORAL DAILY
Status: DISCONTINUED | OUTPATIENT
Start: 2024-03-28 | End: 2024-03-29 | Stop reason: HOSPADM

## 2024-03-28 RX ORDER — MULTIVIT-MIN/IRON FUM/FOLIC AC 7.5 MG-4
1 TABLET ORAL
Status: DISCONTINUED | OUTPATIENT
Start: 2024-03-28 | End: 2024-03-29 | Stop reason: HOSPADM

## 2024-03-28 RX ORDER — TIZANIDINE 2 MG/1
4 TABLET ORAL 2 TIMES DAILY
Status: DISCONTINUED | OUTPATIENT
Start: 2024-03-28 | End: 2024-03-28

## 2024-03-28 RX ORDER — ALPRAZOLAM 0.5 MG/1
0.5 TABLET ORAL NIGHTLY PRN
Status: DISCONTINUED | OUTPATIENT
Start: 2024-03-28 | End: 2024-03-29 | Stop reason: HOSPADM

## 2024-03-28 RX ORDER — ATORVASTATIN CALCIUM 10 MG/1
10 TABLET, FILM COATED ORAL DAILY
Status: DISCONTINUED | OUTPATIENT
Start: 2024-03-28 | End: 2024-03-29 | Stop reason: HOSPADM

## 2024-03-28 RX ORDER — CHOLECALCIFEROL (VITAMIN D3) 25 MCG
1000 TABLET ORAL
Status: DISCONTINUED | OUTPATIENT
Start: 2024-03-28 | End: 2024-03-29 | Stop reason: HOSPADM

## 2024-03-28 RX ORDER — PANTOPRAZOLE SODIUM 40 MG/1
40 TABLET, DELAYED RELEASE ORAL
Status: DISCONTINUED | OUTPATIENT
Start: 2024-03-28 | End: 2024-03-29 | Stop reason: HOSPADM

## 2024-03-28 RX ORDER — CELECOXIB 200 MG/1
200 CAPSULE ORAL DAILY
Status: DISCONTINUED | OUTPATIENT
Start: 2024-03-28 | End: 2024-03-29 | Stop reason: HOSPADM

## 2024-03-28 RX ORDER — GABAPENTIN 300 MG/1
300 CAPSULE ORAL NIGHTLY
Status: DISCONTINUED | OUTPATIENT
Start: 2024-03-28 | End: 2024-03-29 | Stop reason: HOSPADM

## 2024-03-28 RX ORDER — ASPIRIN 81 MG/1
81 TABLET ORAL DAILY
Status: DISCONTINUED | OUTPATIENT
Start: 2024-03-28 | End: 2024-03-29 | Stop reason: HOSPADM

## 2024-03-28 RX ORDER — CILOSTAZOL 50 MG/1
100 TABLET ORAL 2 TIMES DAILY
Status: DISCONTINUED | OUTPATIENT
Start: 2024-03-28 | End: 2024-03-28

## 2024-03-28 RX ORDER — NYSTATIN 100000 [USP'U]/ML
5 SUSPENSION ORAL 4 TIMES DAILY
Status: DISCONTINUED | OUTPATIENT
Start: 2024-03-28 | End: 2024-03-29 | Stop reason: HOSPADM

## 2024-03-28 RX ADMIN — ATORVASTATIN CALCIUM 10 MG: 10 TABLET, FILM COATED ORAL at 09:11

## 2024-03-28 RX ADMIN — OSELTAMAVIR PHOSPHATE 75 MG: 75 CAPSULE ORAL at 21:22

## 2024-03-28 RX ADMIN — Medication 1000 UNITS: at 09:12

## 2024-03-28 RX ADMIN — IPRATROPIUM BROMIDE AND ALBUTEROL SULFATE 3 ML: .5; 3 SOLUTION RESPIRATORY (INHALATION) at 06:07

## 2024-03-28 RX ADMIN — ASPIRIN 81 MG: 81 TABLET, COATED ORAL at 09:11

## 2024-03-28 RX ADMIN — NYSTATIN 500000 UNITS: 100000 SUSPENSION ORAL at 21:28

## 2024-03-28 RX ADMIN — NYSTATIN 500000 UNITS: 100000 SUSPENSION ORAL at 18:17

## 2024-03-28 RX ADMIN — CELECOXIB 200 MG: 200 CAPSULE ORAL at 09:13

## 2024-03-28 RX ADMIN — NYSTATIN 500000 UNITS: 100000 SUSPENSION ORAL at 14:35

## 2024-03-28 RX ADMIN — PREDNISONE 40 MG: 20 TABLET ORAL at 09:12

## 2024-03-28 RX ADMIN — PERFLUTREN 2 ML OF DILUTION: 6.52 INJECTION, SUSPENSION INTRAVENOUS at 13:42

## 2024-03-28 RX ADMIN — GABAPENTIN 300 MG: 300 CAPSULE ORAL at 21:22

## 2024-03-28 RX ADMIN — MULTIPLE VITAMINS W/ MINERALS TAB 1 TABLET: TAB at 09:12

## 2024-03-28 RX ADMIN — IPRATROPIUM BROMIDE AND ALBUTEROL SULFATE 3 ML: .5; 3 SOLUTION RESPIRATORY (INHALATION) at 11:28

## 2024-03-28 RX ADMIN — TIZANIDINE 4 MG: 2 TABLET ORAL at 09:14

## 2024-03-28 RX ADMIN — OSELTAMAVIR PHOSPHATE 75 MG: 75 CAPSULE ORAL at 09:14

## 2024-03-28 RX ADMIN — ENOXAPARIN SODIUM 40 MG: 40 INJECTION SUBCUTANEOUS at 18:17

## 2024-03-28 RX ADMIN — PANTOPRAZOLE SODIUM 40 MG: 40 TABLET, DELAYED RELEASE ORAL at 09:12

## 2024-03-28 RX ADMIN — IPRATROPIUM BROMIDE AND ALBUTEROL SULFATE 3 ML: .5; 3 SOLUTION RESPIRATORY (INHALATION) at 18:50

## 2024-03-28 RX ADMIN — AZITHROMYCIN DIHYDRATE 500 MG: 250 TABLET ORAL at 21:22

## 2024-03-28 ASSESSMENT — PAIN SCALES - GENERAL
PAINLEVEL_OUTOF10: 0 - NO PAIN
PAINLEVEL_OUTOF10: 0 - NO PAIN

## 2024-03-28 ASSESSMENT — PAIN - FUNCTIONAL ASSESSMENT: PAIN_FUNCTIONAL_ASSESSMENT: 0-10

## 2024-03-28 ASSESSMENT — ACTIVITIES OF DAILY LIVING (ADL): LACK_OF_TRANSPORTATION: NO

## 2024-03-28 NOTE — CARE PLAN
The patient's goals for the shift include Fluids and sleep    The clinical goals for the shift include IV fluids and rest    Pt. In bed with eyes closed for most of the shift.  Denies any pain  Problem: Pain  Goal: My pain/discomfort is manageable  Outcome: Progressing     Problem: Safety  Goal: Patient will be injury free during hospitalization  Outcome: Progressing  Goal: I will remain free of falls  Outcome: Progressing     Problem: Daily Care  Goal: Daily care needs are met  Outcome: Progressing     Problem: Psychosocial Needs  Goal: Demonstrates ability to cope with hospitalization/illness  Outcome: Progressing  Goal: Collaborate with me, my family, and caregiver to identify my specific goals  Outcome: Progressing     Problem: Discharge Barriers  Goal: My discharge needs are met  Outcome: Progressing

## 2024-03-28 NOTE — PROGRESS NOTES
Subjective Data:  Patient examined at bedside. Denies any current chest pain/pressure/discomfort, palpitations, SOB, orthopnea, dizziness, or lower extremity edema. She is currently on 2L of oxygen via n/c with saturation in the high 90s. She denies any family hx of cardiac disease and has never been evaluated by a Cardiologist.          Objective Data:  Last Recorded Vitals:  Vitals:    03/28/24 0300 03/28/24 0613 03/28/24 0656 03/28/24 0700   BP: (!) 152/95   144/64   BP Location:       Patient Position:       Pulse: 92   64   Resp: 20   18   Temp: 36.3 °C (97.3 °F)   36.6 °C (97.9 °F)   TempSrc:       SpO2: 96% 96% (S) (!) 85% 96%   Weight:       Height:           Last Labs:  CBC - 3/27/2024:  1:33 PM  11.8 14.3 209    42.4      CMP - 3/28/2024:  5:10 AM  8.4 5.3 32 --- 0.2   _ 3.1 26 62      PTT - No results in last year.  _   _ _     TROPHS   Date/Time Value Ref Range Status   03/28/2024 05:11 AM 50 0 - 13 ng/L Final   03/27/2024 06:00  0 - 13 ng/L Final     Comment:     Previous result verified on 3/27/2024 1409 on specimen/case [id number] called with component TRPHS for procedure Troponin I, High Sensitivity, Initial with value 135 ng/L.   03/27/2024 02:28  0 - 13 ng/L Final     Comment:     Previous result verified on 3/27/2024 1409 on specimen/case [id number] called with component TRPHS for procedure Troponin I, High Sensitivity, Initial with value 135 ng/L.     BNP   Date/Time Value Ref Range Status   03/27/2024 01:33 PM 74 0 - 99 pg/mL Final     LDLCALC   Date/Time Value Ref Range Status   03/28/2024 05:10 AM 57 <=99 mg/dL Final     Comment:                                 Near   Borderline      AGE      Desirable  Optimal    High     High     Very High     0-19 Y     0 - 109     ---    110-129   >/= 130     ----    20-24 Y     0 - 119     ---    120-159   >/= 160     ----      >24 Y     0 -  99   100-129  130-159   160-189     >/=190     12/11/2023 08:51 AM 93 <=99 mg/dL Final      "Comment:                                 Near   Borderline      AGE      Desirable  Optimal    High     High     Very High     0-19 Y     0 - 109     ---    110-129   >/= 130     ----    20-24 Y     0 - 119     ---    120-159   >/= 160     ----      >24 Y     0 -  99   100-129  130-159   160-189     >/=190       VLDL   Date/Time Value Ref Range Status   03/28/2024 05:10 AM 16 0 - 40 mg/dL Final   12/11/2023 08:51 AM 13 0 - 40 mg/dL Final      Last I/O:  I/O last 3 completed shifts:  In: 2100 (34.8 mL/kg) [IV Piggyback:2100]  Out: - (0 mL/kg)   Weight: 60.3 kg     Past Cardiology Tests (Last 3 Years):  EKG:  ECG 12 lead 03/27/2024 (Preliminary)    Echo:  No results found for this or any previous visit from the past 1095 days.    Ejection Fractions:  No results found for: \"EF\"  Cath:  No results found for this or any previous visit from the past 1095 days.    Stress Test:  No results found for this or any previous visit from the past 1095 days.    Cardiac Imaging:  No results found for this or any previous visit from the past 1095 days.      Inpatient Medications:  Scheduled medications   Medication Dose Route Frequency    aspirin  81 mg oral Daily    atorvastatin  10 mg oral Daily    azithromycin  500 mg oral q24h SELENE    celecoxib  200 mg oral Daily    cholecalciferol  1,000 Units oral Daily    enoxaparin  40 mg subcutaneous q24h    gabapentin  300 mg oral Nightly    multivitamin with minerals  1 tablet oral Daily    nicotine  1 patch transdermal Daily    oseltamivir  75 mg oral BID    oxygen   inhalation Continuous - Inhalation    oxygen   inhalation Continuous - Inhalation    pantoprazole  40 mg oral Daily before breakfast    polyethylene glycol  17 g oral Daily    predniSONE  40 mg oral Daily    tiZANidine  4 mg oral Daily     PRN medications   Medication    acetaminophen    ALPRAZolam    guaiFENesin    ipratropium-albuteroL    ondansetron ODT    Or    ondansetron     Continuous Medications   Medication Dose Last " Rate    sodium chloride 0.9%  125 mL/hr 125 mL/hr (03/27/24 1720)       Physical Exam:  General: awake, alert and oriented. No acute distress.   Skin: Skin is warm, dry and intact without rashes or lesions.   HEENT: normocephalic, atraumatic; conjunctivae are clear without exudates or hemorrhage. Sclera is non-icteric. Eyelids are normal in appearance without swelling or lesions. Hearing intact. Nares are patent bilaterally. Moist mucous membranes.   Cardiovascular: heart rate and rhythm are normal. No murmurs, gallops, or rubs are auscultated. S1 and S2 are heard and are of normal intensity. No JVD, no carotid bruits  Respiratory: scattered wheezes bilaterally   Gastrointestinal: non-distended, non-tender  Genitourinary: exam deferred  Musculoskeletal: no deformities  Extremities: pulses palpable bilaterally; no swelling or erythema  Neurological: no focal deficits  Psychiatric: appropriate mood and affect; good judgment and insight       Assessment/Plan     Elevated troponin:  -Trending high-sensitivity troponins is [phone]  -EKG displays no evidence of ACS   -Will order an echo given elevated troponin and aortic atherosclerosis noted on chest x-ray to rule out underlying structural heart disease/regional wall motion abnormalities  -Given her clinical presentation with influenza, elevated troponins likely from nonischemic myocardial injury from infection rather than MI  -No ischemic evaluation warranted at present   -Will defer management for Influenza to primary team         Peripheral IV 03/27/24 22 G Right;Ventral Forearm (Active)   Site Assessment Clean;Dry;Intact 03/28/24 0600   Dressing Type Transparent 03/28/24 0600   Line Status Saline locked 03/28/24 0600   Dressing Status Clean;Dry 03/28/24 0600   Number of days: 1       Peripheral IV 03/27/24 22 G Proximal;Right Forearm (Active)   Site Assessment Clean;Dry;Intact 03/28/24 0600   Dressing Type Transparent 03/28/24 0600   Line Status Saline locked  03/28/24 0600   Dressing Status Clean;Dry 03/28/24 0600   Number of days: 1       Code Status:  Full Code    Thank you for allowing me to participate in the care of this patient. Please reach me out if you have any questions or if you need any clarifications regarding the patient's care.          Mary Kovacs, APRN-CNP, DNP

## 2024-03-28 NOTE — PROGRESS NOTES
03/28/24 1457   Discharge Planning   Living Arrangements Friends   Support Systems Children;Friends/neighbors   Assistance Needed None   Type of Residence Private residence   Number of Stairs to Enter Residence 3   Who is requesting discharge planning? Provider   Home or Post Acute Services None   Patient expects to be discharged to: Home   Does the patient need discharge transport arranged? Yes   RoundTrip coordination needed? No   Has discharge transport been arranged? No   Financial Resource Strain   How hard is it for you to pay for the very basics like food, housing, medical care, and heating? Not hard   Housing Stability   In the last 12 months, was there a time when you were not able to pay the mortgage or rent on time? N   In the last 12 months, how many places have you lived? 1   In the last 12 months, was there a time when you did not have a steady place to sleep or slept in a shelter (including now)? N   Transportation Needs   In the past 12 months, has lack of transportation kept you from medical appointments or from getting medications? no   In the past 12 months, has lack of transportation kept you from meetings, work, or from getting things needed for daily living? No     Care Transitions: Patient reviewed in care round meeting this AM and is not medically appropriate for discharge today. ADOD 24 hours. Spoke to patient via phone @ [phone] to maintain droplet precautions. Role of TCC explained. Demographics and contacts verified. She lives with a friend that she is also the caregiver for. 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 States her employee is caring for her business while in hospital. PCP is Dr. Medina @ [facility] in [place]. Her pharmacy of choice is [facility] in [place] for medication needs. Denies any difficulty obtaining/affording medications. She is independent at home with ADL's and drives herself to appointments and such. Discussed  discharge plans. States she will return home, denies any needs or in home services at this time. Care team to follow. Domitila Sandoval RN/TCC

## 2024-03-28 NOTE — PROGRESS NOTES
Nicole Jesus is a 68 y.o. female on day 1 of admission presenting with Influenza.      Subjective   Patient sitting up in bed and appears clinically improved compared to yesterday's assessment.  Currently denies complaints of nausea, vomiting, or diarrhea.  On O2 at 1.5 L does not use oxygen at home.  Denies complaints of pain.  Appears slightly dyspneic with conversation       Objective     Last Recorded Vitals  /64   Pulse 64   Temp 36.6 °C (97.9 °F)   Resp 18   Wt 60.3 kg (132 lb 15 oz)   SpO2 96%   Intake/Output last 3 Shifts:    Intake/Output Summary (Last 24 hours) at 3/28/2024 1103  Last data filed at 3/27/2024 1637  Gross per 24 hour   Intake 2100 ml   Output --   Net 2100 ml       Admission Weight  Weight: 62.1 kg (137 lb) (03/27/24 1318)    Daily Weight  03/27/24 : 60.3 kg (132 lb 15 oz)    Image Results  ECG 12 lead  Sinus tachycardia  Rightward axis  Pulmonary disease pattern  Nonspecific ST abnormality  Abnormal ECG  No previous ECGs available      Physical Exam   General Appearance: AAO x 3, not in acute distress  Skin: skin color pink, warm, and dry; no suspicious rashes or lesions  Eyes : PERRL, EOM's intact  ENT: mucous membranes pink and moist  Neck: normocephalic  Respiratory: lungs clear to auscultation anteriorly; no wheezing, rhonchi, or crackles. On O2 at 1.5 L  Heart: regular rate and rhythm. telemetry shows sinus rhythm  Abdomen: Nondistended, positive bowel sounds x4, soft,  denies tenderness with palpation  Extremities: no edema   Peripheral pulses: normal x4 extremities  Neuro: alert, coherent and conversant, no focal motor deficits    Relevant Results  Results for orders placed or performed during the hospital encounter of 03/27/24 (from the past 24 hour(s))   ECG 12 lead   Result Value Ref Range    Ventricular Rate 136 BPM    Atrial Rate 136 BPM    RI Interval 136 ms    QRS Duration 58 ms    QT Interval 268 ms    QTC Calculation(Bazett) 403 ms    P Axis 68 degrees    R Axis  92 degrees    T Axis 84 degrees    QRS Count 22 beats    Q Onset 221 ms    P Onset 153 ms    P Offset 201 ms    T Offset 355 ms    QTC Fredericia 351 ms   Sars-CoV-2 PCR   Result Value Ref Range    Coronavirus 2019, PCR Not Detected Not Detected   Influenza A, and B PCR   Result Value Ref Range    Flu A Result Detected (A) Not Detected    Flu B Result Not Detected Not Detected   RSV PCR   Result Value Ref Range    RSV PCR Not Detected Not Detected   CBC and Auto Differential   Result Value Ref Range    WBC 11.8 (H) 4.4 - 11.3 x10*3/uL    nRBC 0.0 0.0 - 0.0 /100 WBCs    RBC 4.92 4.00 - 5.20 x10*6/uL    Hemoglobin 14.3 12.0 - 16.0 g/dL    Hematocrit 42.4 36.0 - 46.0 %    MCV 86 80 - 100 fL    MCH 29.1 26.0 - 34.0 pg    MCHC 33.7 32.0 - 36.0 g/dL    RDW 15.8 (H) 11.5 - 14.5 %    Platelets 209 150 - 450 x10*3/uL    Neutrophils % 73.1 40.0 - 80.0 %    Immature Granulocytes %, Automated 0.3 0.0 - 0.9 %    Lymphocytes % 15.4 13.0 - 44.0 %    Monocytes % 10.4 2.0 - 10.0 %    Eosinophils % 0.5 0.0 - 6.0 %    Basophils % 0.3 0.0 - 2.0 %    Neutrophils Absolute 8.61 (H) 1.20 - 7.70 x10*3/uL    Immature Granulocytes Absolute, Automated 0.04 0.00 - 0.70 x10*3/uL    Lymphocytes Absolute 1.82 1.20 - 4.80 x10*3/uL    Monocytes Absolute 1.23 (H) 0.10 - 1.00 x10*3/uL    Eosinophils Absolute 0.06 0.00 - 0.70 x10*3/uL    Basophils Absolute 0.03 0.00 - 0.10 x10*3/uL   Basic metabolic panel   Result Value Ref Range    Glucose 100 (H) 74 - 99 mg/dL    Sodium 130 (L) 136 - 145 mmol/L    Potassium 3.6 3.5 - 5.3 mmol/L    Chloride 96 (L) 98 - 107 mmol/L    Bicarbonate 23 21 - 32 mmol/L    Anion Gap 15 10 - 20 mmol/L    Urea Nitrogen 10 6 - 23 mg/dL    Creatinine 0.76 0.50 - 1.05 mg/dL    eGFR 86 >60 mL/min/1.73m*2    Calcium 9.6 8.6 - 10.3 mg/dL   B-Type Natriuretic Peptide   Result Value Ref Range    BNP 74 0 - 99 pg/mL   Troponin I, High Sensitivity, Initial   Result Value Ref Range    Troponin I, High Sensitivity 135 (HH) 0 - 13 ng/L    Troponin, High Sensitivity, 1 Hour   Result Value Ref Range    Troponin I, High Sensitivity 141 (HH) 0 - 13 ng/L   Troponin I, High Sensitivity   Result Value Ref Range    Troponin I, High Sensitivity 105 (HH) 0 - 13 ng/L   Comprehensive metabolic panel   Result Value Ref Range    Glucose 118 (H) 74 - 99 mg/dL    Sodium 137 136 - 145 mmol/L    Potassium 3.9 3.5 - 5.3 mmol/L    Chloride 111 (H) 98 - 107 mmol/L    Bicarbonate 21 21 - 32 mmol/L    Anion Gap 9 (L) 10 - 20 mmol/L    Urea Nitrogen 13 6 - 23 mg/dL    Creatinine 0.43 (L) 0.50 - 1.05 mg/dL    eGFR >90 >60 mL/min/1.73m*2    Calcium 8.4 (L) 8.6 - 10.3 mg/dL    Albumin 3.1 (L) 3.4 - 5.0 g/dL    Alkaline Phosphatase 62 33 - 136 U/L    Total Protein 5.3 (L) 6.4 - 8.2 g/dL    AST 32 9 - 39 U/L    Bilirubin, Total 0.2 0.0 - 1.2 mg/dL    ALT 26 7 - 45 U/L   Lipid Panel   Result Value Ref Range    Cholesterol 129 0 - 199 mg/dL    HDL-Cholesterol 56.0 mg/dL    Cholesterol/HDL Ratio 2.3     LDL Calculated 57 <=99 mg/dL    VLDL 16 0 - 40 mg/dL    Triglycerides 79 0 - 149 mg/dL    Non HDL Cholesterol 73 0 - 149 mg/dL   Troponin I, High Sensitivity   Result Value Ref Range    Troponin I, High Sensitivity 50 (H) 0 - 13 ng/L   Lavender Top   Result Value Ref Range    Extra Tube Hold for add-ons.      Scheduled medications  aspirin, 81 mg, oral, Daily  atorvastatin, 10 mg, oral, Daily  azithromycin, 500 mg, oral, q24h SELENE  celecoxib, 200 mg, oral, Daily  cholecalciferol, 1,000 Units, oral, Daily  enoxaparin, 40 mg, subcutaneous, q24h  gabapentin, 300 mg, oral, Nightly  lisinopril 20 mg, hydroCHLOROthiazide 12.5 mg for Zestoretic/Prinizide, , oral, Daily  multivitamin with minerals, 1 tablet, oral, Daily  nicotine, 1 patch, transdermal, Daily  oseltamivir, 75 mg, oral, BID  oxygen, , inhalation, Continuous - Inhalation  oxygen, , inhalation, Continuous - Inhalation  pantoprazole, 40 mg, oral, Daily before breakfast  polyethylene glycol, 17 g, oral, Daily  predniSONE, 40 mg,  oral, Daily  tiZANidine, 4 mg, oral, Daily      Continuous medications     PRN medications  PRN medications: acetaminophen, ALPRAZolam, guaiFENesin, ipratropium-albuteroL, ondansetron ODT **OR** ondansetron    Assessment/Plan      Principal Problem:    Influenza    Nicole Jesus is a 68 y.o. female past medical history of anxiety, osteoarthritis, hyperlipidemia, chronic back pain and she follows with pain management, hypertension, GERD, migraines, and neuroendocrine tumor.  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    WBC is elevated at 11.8 with neutrophils 8.61.  Patient was positive for influenza A.  Chest x-ray done and shows left basilar infiltrate.   Patient admitted to telemetry with a working diagnosis of influenza A, tachycardia, elevated troponin, bronchitis, and rule out COPD     Plan:  Influenza a  -Continue Tamiflu  -IV fluids DC'd  -Zofran as needed  -Diet advanced     Tachycardia/elevated troponin  -Cardiology consulted-appreciate recommendations  -Troponin elevated at 135 with repeat at 141.  Repeat troponin 50  -BNP normal at 74  -Continue telemetry  -Continue aspirin  -Continue atorvastatin  -Lipid panel within normal limits     Hypotension  -Resolved.  Restarted on home lisinopril/hydrochlorothiazide     Bronchitis/rule out COPD  -COPD navigator consulted.  Patient has extensive smoking history  -Continue prednisone day 2  -Continue Robitussin as needed for cough  -Wean O2 as tolerated.  Patient on 1.5 L does not use at home.  Patient will need walking pulse ox prior to discharge.  Patient would benefit from home nebulizer she does not currently have treatment or official diagnosis for COPD  -Continue azithromycin day 2     Anxiety  -Continue Xanax     Chronic pain management  -Continue Neurontin & Celebrex     Discharge disposition: Anticipate discharge home when medically stable.  Patient may need home O2 therapy    Masha ROBERTSON  DEIRDRE BullN-CNP

## 2024-03-28 NOTE — CARE PLAN
Problem: Pain  Goal: My pain/discomfort is manageable  Outcome: Progressing     Problem: Daily Care  Goal: Daily care needs are met  Outcome: Progressing     Problem: Psychosocial Needs  Goal: Collaborate with me, my family, and caregiver to identify my specific goals  Outcome: Progressing   The patient's goals for the shift include Fluids and sleep    The clinical goals for the shift include promote rest

## 2024-03-28 NOTE — CONSULTS
Inpatient consult to Cardiology  Consult performed by: Wade Ring MD  Consult ordered by: Masha Bull, NILESH-CNP  Reason for consult: elevated troponin      History Of Present Illness:    Mrs. Nicole Jesus is a 68 y.o. current everyday smoker female being consulted by the Cardiology team for elevated troponin. Patient with past medical history significant for anxiety, osteoarthritis, hyperlipidemia, chronic back pain and she follows with pain management, hypertension, GERD, migraines, tobacco abuse and neuroendocrine tumor.  She had a noncancerous pancreatic tumor with metastasis to the liver and received 1 dose of chemo 15 years ago. She endorses vomiting after a cough episode and then continued vomiting multiple times. Also reported she had 2 episodes of diarrhea. Notably, her  is ill and suffers from cancer. She denies chest pain, shortness of breath, leg edema, lightheadedness, headaches, fever, chills, orthopnea, paroxysmal nocturnal dyspnea or syncope.  In the ER, temp 39.3, heart rate 132, respiratory rate 44, blood pressure 128/84.  In oxygen saturation 88% on room air.  2 L of oxygen applied pulse ox increased to 91%.  Labs drawn showed serum glucose 100, sodium 130, chloride 96.  Troponin 135 - 141 - 105. EKG shows sinus tachycardia rhythm with no signs of ACS.   BNP negative at 74.  WBC is elevated at 11.8 with neutrophils 8.61.  Patient was positive for influenza A.  Chest x-ray done and shows left basilar infiltrate.  Patient received tylenol, aspirin, toradol, levaquin, and sodium chloride bolus. Patient admitted for clinical compensation with a working diagnosis of influenza A, tachycardia, elevated troponin, bronchitis.        Last Recorded Vitals:  Vitals:    03/27/24 1700 03/27/24 1716 03/27/24 1745 03/27/24 1900   BP: 85/67 92/64 101/66 91/58   BP Location: Left arm Left arm Left arm Left arm   Patient Position: Lying Lying Sitting Lying   Pulse: 96 96 99 86   Resp:  "(!) 23 (!) 24 (!) 24 22   Temp:  36.9 °C (98.4 °F) 36.5 °C (97.7 °F) 35.8 °C (96.5 °F)   TempSrc:   Temporal    SpO2: 96% 99% (!) 93% 93%   Weight:   60.3 kg (132 lb 15 oz)    Height:   1.575 m (5' 2\")        Last Labs:  CBC - 3/27/2024:  1:33 PM  11.8 14.3 209    42.4      CMP - 3/27/2024:  1:33 PM  9.6 6.9 22 --- 0.5   _ 4.2 25 102      PTT - No results in last year.  _   _ _     Troponin I, High Sensitivity   Date/Time Value Ref Range Status   03/27/2024 06:00  (HH) 0 - 13 ng/L Final     Comment:     Previous result verified on 3/27/2024 1409 on specimen/case [id number] called with component [employer] for procedure Troponin I, High Sensitivity, Initial with value 135 ng/L.   03/27/2024 02:28  (HH) 0 - 13 ng/L Final     Comment:     Previous result verified on 3/27/2024 1409 on specimen/case [id number] called with component [facility] for procedure Troponin I, High Sensitivity, Initial with value 135 ng/L.   03/27/2024 01:33  (HH) 0 - 13 ng/L Final     BNP   Date/Time Value Ref Range Status   03/27/2024 01:33 PM 74 0 - 99 pg/mL Final     LDL Calculated   Date/Time Value Ref Range Status   12/11/2023 08:51 AM 93 <=99 mg/dL Final     Comment:                                 Near   Borderline      AGE      Desirable  Optimal    High     High     Very High     0-19 Y     0 - 109     ---    110-129   >/= 130     ----    20-24 Y     0 - 119     ---    120-159   >/= 160     ----      >24 Y     0 -  99   100-129  130-159   160-189     >/=190       VLDL   Date/Time Value Ref Range Status   12/11/2023 08:51 AM 13 0 - 40 mg/dL Final      Last I/O:  I/O last 3 completed shifts:  In: 2100 (34.8 mL/kg) [IV Piggyback:2100]  Out: - (0 mL/kg)   Weight: 60.3 kg     Past Cardiology Tests (Last 3 Years):  EKG:  No results found for this or any previous visit from the past 1095 days.    Echo:  No results found for this or any previous visit from the past 1095 days.    Ejection Fractions:  No results found for: " "\"EF\"  Cath:  No results found for this or any previous visit from the past 1095 days.    Stress Test:  No results found for this or any previous visit from the past 1095 days.    Cardiac Imaging:  No results found for this or any previous visit from the past 1095 days.      Past Medical History:  She has a past medical history of Hypertension.    Past Surgical History:  She has a past surgical history that includes Hysterectomy; Pancreas surgery; Cosmetic surgery; Abdominal surgery; Breast surgery; and Cataract extraction.      Social History:  She reports that she has been smoking cigarettes. She has a 150.00 pack-year smoking history. She has never used smokeless tobacco. She reports that she does not drink alcohol and does not use drugs.    Family History:  No family history on file.     Allergies:  Opioids-meperidine and related, Oxycodone, Sulfamethoxazole-trimethoprim, and Hydrocodone-acetaminophen    Inpatient Medications:  Scheduled medications   Medication Dose Route Frequency    azithromycin  500 mg oral q24h SELENE    enoxaparin  40 mg subcutaneous q24h    nicotine  1 patch transdermal Daily    oseltamivir  75 mg oral BID    oxygen   inhalation Continuous - Inhalation    oxygen   inhalation Continuous - Inhalation    polyethylene glycol  17 g oral Daily    [START ON 3/28/2024] predniSONE  40 mg oral Daily     PRN medications   Medication    acetaminophen    guaiFENesin    ipratropium-albuteroL    ondansetron ODT    Or    ondansetron     Continuous Medications   Medication Dose Last Rate    sodium chloride 0.9%  125 mL/hr 125 mL/hr (03/27/24 1720)     Outpatient Medications:  Current Outpatient Medications   Medication Instructions    ALPRAZolam (Xanax) 0.5 mg tablet 1 tablet, oral, Nightly PRN    aspirin 81 mg, oral, Daily    atorvastatin (LIPITOR) 10 mg, oral, Daily RT    calcium carbonate-vitamin D3 (Caltrate 600 plus D) 600 mg-20 mcg (800 unit) tablet,chewable 1 tablet, oral, Daily    celecoxib (CELEBREX) " 200 mg, oral, Daily    cholecalciferol (VITAMIN D-3) 1,000 Units, oral, Daily RT    cilostazol (PLETAL) 100 mg, oral, 2 times daily    gabapentin (NEURONTIN) 300 mg, oral, Daily    lisinopriL-hydrochlorothiazide 20-12.5 mg tablet 1 tablet, oral, Daily RT    multivitamin tablet 1 tablet, oral, Daily RT    nystatin (MYCOSTATIN) 500,000 Units, mucous membrane, Every 6 hours PRN    omeprazole OTC (PRILOSEC OTC) 20 mg, oral, Daily before breakfast, Do not crush, chew, or split.    tiZANidine (ZANAFLEX) 4 mg, oral, Daily       Physical Exam:  General: alert, oriented and in no acute distress  HEENT: NC/AT; EOMI; PERRLA, external ear is normal  Neck: supple; trachea midline; no masses; no JVD  Chest: reduced breath sounds bilaterally; COPD pattern  Cardio: regular rhythm, S1S2 normal, no murmurs  Abdomen: Soft, non-tender, non-distension, no organomegaly  Extremities: no clubbing/cyanosis/edema  Neuro: Grossly intact     Psychiatric: Normal mood and affect      Assessment/Plan     Mrs. Nicole Jesus is a 68 y.o. current everyday smoker female being consulted by the Cardiology team for elevated troponin. Patient with past medical history significant for anxiety, osteoarthritis, hyperlipidemia, chronic back pain and she follows with pain management, hypertension, GERD, migraines, tobacco abuse and neuroendocrine tumor.  She had a noncancerous pancreatic tumor with metastasis to the liver and received 1 dose of chemo 15 years ago. She endorses vomiting after a cough episode and then continued vomiting multiple times. Also reported she had 2 episodes of diarrhea. Notably, her  is ill and suffers from cancer. She denies chest pain, shortness of breath, leg edema, lightheadedness, headaches, fever, chills, orthopnea, paroxysmal nocturnal dyspnea or syncope.  In the ER, temp 39.3, heart rate 132, respiratory rate 44, blood pressure 128/84.  In oxygen saturation 88% on room air.  2 L of oxygen applied pulse ox increased to  91%.  Labs drawn showed serum glucose 100, sodium 130, chloride 96.  Troponin 135 - 141 - 105. EKG shows sinus tachycardia rhythm with no signs of ACS.   BNP negative at 74.  WBC is elevated at 11.8 with neutrophils 8.61.  Patient was positive for influenza A.  Chest x-ray done and shows left basilar infiltrate.  Patient received tylenol, aspirin, toradol, levaquin, and sodium chloride bolus. Patient admitted for clinical compensation with a working diagnosis of influenza A, tachycardia, elevated troponin, bronchitis.    Assessment    # Troponin Elevation  - Troponin 135 - 141 - 105.   - EKG shows sinus tachycardia rhythm with no signs of ACS.   - Troponin elevation is likely attributable to non-ischemic myocardial injury due to myocardial imbalance in oxygen supply/demand secondary to underlying infection and tachycardia.   - Would suggest to continue current medical management per primary team.    - Would suggest to refer her to Cardiology team as outpatient. Echocardiogram.    # COPD / Flu  - Hydration  - Symptoms control    # Atherosclerosis / Hyperlipidemia / PAD  - Keep home medication with Atorvastatin 10mg daily / ASA / Cilostazol.  - Counseled on healthy diet and regular exercise.     # Smoking  - Counseled to quit smoking.     Peripheral IV 03/27/24 22 G Right;Ventral Forearm (Active)   Site Assessment Clean;Dry;Intact 03/27/24 1643   Dressing Status Clean;Dry;Occlusive 03/27/24 1643   Number of days: 0       Peripheral IV 03/27/24 22 G Proximal;Right Forearm (Active)   Site Assessment Clean;Dry;Intact 03/27/24 1644   Line Status Blood return noted 03/27/24 1644   Dressing Status Clean;Dry 03/27/24 1644   Number of days: 0       Code Status:  Full Code    Wade Ring MD  Cardiology

## 2024-03-29 ENCOUNTER — PHARMACY VISIT (OUTPATIENT)
Dept: PHARMACY | Facility: CLINIC | Age: 69
End: 2024-03-29
Payer: MEDICARE

## 2024-03-29 VITALS
HEIGHT: 62 IN | BODY MASS INDEX: 24.46 KG/M2 | SYSTOLIC BLOOD PRESSURE: 129 MMHG | TEMPERATURE: 96.6 F | RESPIRATION RATE: 18 BRPM | WEIGHT: 132.94 LBS | HEART RATE: 88 BPM | OXYGEN SATURATION: 94 % | DIASTOLIC BLOOD PRESSURE: 65 MMHG

## 2024-03-29 LAB
ANION GAP SERPL CALC-SCNC: 11 MMOL/L (ref 10–20)
ATRIAL RATE: 136 BPM
BUN SERPL-MCNC: 11 MG/DL (ref 6–23)
CALCIUM SERPL-MCNC: 8.9 MG/DL (ref 8.6–10.3)
CHLORIDE SERPL-SCNC: 107 MMOL/L (ref 98–107)
CO2 SERPL-SCNC: 22 MMOL/L (ref 21–32)
CREAT SERPL-MCNC: 0.48 MG/DL (ref 0.5–1.05)
EGFRCR SERPLBLD CKD-EPI 2021: >90 ML/MIN/1.73M*2
ERYTHROCYTE [DISTWIDTH] IN BLOOD BY AUTOMATED COUNT: 15.9 % (ref 11.5–14.5)
GLUCOSE SERPL-MCNC: 114 MG/DL (ref 74–99)
HCT VFR BLD AUTO: 33.3 % (ref 36–46)
HGB BLD-MCNC: 11.2 G/DL (ref 12–16)
MCH RBC QN AUTO: 29.5 PG (ref 26–34)
MCHC RBC AUTO-ENTMCNC: 33.6 G/DL (ref 32–36)
MCV RBC AUTO: 88 FL (ref 80–100)
NRBC BLD-RTO: 0 /100 WBCS (ref 0–0)
P AXIS: 68 DEGREES
P OFFSET: 201 MS
P ONSET: 153 MS
PLATELET # BLD AUTO: 209 X10*3/UL (ref 150–450)
POTASSIUM SERPL-SCNC: 4 MMOL/L (ref 3.5–5.3)
PR INTERVAL: 136 MS
Q ONSET: 221 MS
QRS COUNT: 22 BEATS
QRS DURATION: 58 MS
QT INTERVAL: 268 MS
QTC CALCULATION(BAZETT): 403 MS
QTC FREDERICIA: 351 MS
R AXIS: 92 DEGREES
RBC # BLD AUTO: 3.8 X10*6/UL (ref 4–5.2)
SODIUM SERPL-SCNC: 136 MMOL/L (ref 136–145)
T AXIS: 84 DEGREES
T OFFSET: 355 MS
VENTRICULAR RATE: 136 BPM
WBC # BLD AUTO: 10.5 X10*3/UL (ref 4.4–11.3)

## 2024-03-29 PROCEDURE — 99232 SBSQ HOSP IP/OBS MODERATE 35: CPT

## 2024-03-29 PROCEDURE — 94761 N-INVAS EAR/PLS OXIMETRY MLT: CPT

## 2024-03-29 PROCEDURE — 94640 AIRWAY INHALATION TREATMENT: CPT

## 2024-03-29 PROCEDURE — 99231 SBSQ HOSP IP/OBS SF/LOW 25: CPT

## 2024-03-29 PROCEDURE — RXMED WILLOW AMBULATORY MEDICATION CHARGE

## 2024-03-29 PROCEDURE — 85027 COMPLETE CBC AUTOMATED: CPT

## 2024-03-29 PROCEDURE — 2500000001 HC RX 250 WO HCPCS SELF ADMINISTERED DRUGS (ALT 637 FOR MEDICARE OP)

## 2024-03-29 PROCEDURE — 80048 BASIC METABOLIC PNL TOTAL CA: CPT

## 2024-03-29 PROCEDURE — 2500000004 HC RX 250 GENERAL PHARMACY W/ HCPCS (ALT 636 FOR OP/ED)

## 2024-03-29 PROCEDURE — 2500000002 HC RX 250 W HCPCS SELF ADMINISTERED DRUGS (ALT 637 FOR MEDICARE OP, ALT 636 FOR OP/ED)

## 2024-03-29 PROCEDURE — 36415 COLL VENOUS BLD VENIPUNCTURE: CPT

## 2024-03-29 RX ORDER — PREDNISONE 20 MG/1
40 TABLET ORAL DAILY
Qty: 6 TABLET | Refills: 0 | Status: SHIPPED | OUTPATIENT
Start: 2024-03-30 | End: 2024-04-02

## 2024-03-29 RX ORDER — OSELTAMIVIR PHOSPHATE 75 MG/1
75 CAPSULE ORAL 2 TIMES DAILY
Qty: 6 CAPSULE | Refills: 0 | Status: SHIPPED | OUTPATIENT
Start: 2024-03-29 | End: 2024-04-01

## 2024-03-29 RX ORDER — NYSTATIN 100000 [USP'U]/ML
5 SUSPENSION ORAL 4 TIMES DAILY
Qty: 280 ML | Refills: 0 | Status: SHIPPED | OUTPATIENT
Start: 2024-03-29 | End: 2024-04-12

## 2024-03-29 RX ORDER — IPRATROPIUM BROMIDE AND ALBUTEROL SULFATE 2.5; .5 MG/3ML; MG/3ML
3 SOLUTION RESPIRATORY (INHALATION) EVERY 6 HOURS PRN
Qty: 360 ML | Refills: 0 | Status: SHIPPED | OUTPATIENT
Start: 2024-03-29 | End: 2024-04-28

## 2024-03-29 RX ORDER — AZITHROMYCIN 250 MG/1
500 TABLET, FILM COATED ORAL ONCE
Status: COMPLETED | OUTPATIENT
Start: 2024-03-29 | End: 2024-03-29

## 2024-03-29 RX ADMIN — PANTOPRAZOLE SODIUM 40 MG: 40 TABLET, DELAYED RELEASE ORAL at 06:31

## 2024-03-29 RX ADMIN — IPRATROPIUM BROMIDE AND ALBUTEROL SULFATE 3 ML: .5; 3 SOLUTION RESPIRATORY (INHALATION) at 11:19

## 2024-03-29 RX ADMIN — AZITHROMYCIN DIHYDRATE 500 MG: 250 TABLET ORAL at 11:01

## 2024-03-29 RX ADMIN — PREDNISONE 40 MG: 20 TABLET ORAL at 09:09

## 2024-03-29 RX ADMIN — ASPIRIN 81 MG: 81 TABLET, COATED ORAL at 09:08

## 2024-03-29 RX ADMIN — NYSTATIN 500000 UNITS: 100000 SUSPENSION ORAL at 06:31

## 2024-03-29 RX ADMIN — ATORVASTATIN CALCIUM 10 MG: 10 TABLET, FILM COATED ORAL at 09:09

## 2024-03-29 RX ADMIN — NYSTATIN 500000 UNITS: 100000 SUSPENSION ORAL at 13:25

## 2024-03-29 RX ADMIN — TIZANIDINE 4 MG: 2 TABLET ORAL at 09:09

## 2024-03-29 RX ADMIN — CELECOXIB 200 MG: 200 CAPSULE ORAL at 09:09

## 2024-03-29 RX ADMIN — Medication 1000 UNITS: at 06:31

## 2024-03-29 RX ADMIN — IPRATROPIUM BROMIDE AND ALBUTEROL SULFATE 3 ML: .5; 3 SOLUTION RESPIRATORY (INHALATION) at 06:57

## 2024-03-29 RX ADMIN — MULTIPLE VITAMINS W/ MINERALS TAB 1 TABLET: TAB at 06:31

## 2024-03-29 RX ADMIN — OSELTAMAVIR PHOSPHATE 75 MG: 75 CAPSULE ORAL at 09:09

## 2024-03-29 RX ADMIN — HYDROCHLOROTHIAZIDE: 12.5 CAPSULE ORAL at 09:09

## 2024-03-29 ASSESSMENT — PAIN SCALES - GENERAL: PAINLEVEL_OUTOF10: 0 - NO PAIN

## 2024-03-29 NOTE — DISCHARGE INSTRUCTIONS
Discharge:    Discharge home  Resume home meds.   Escribed to [facility] duoneb, tamiflu x 6 doses, prednisone 40 mg x 3 days, and nystatin swish and swallow  Follow-up with PCP in 1 week  Follow-up with COPD program  Use nebulizer 4 times a day as needed for shortness of breath    Thank you for allowing [facility] to participate in your care. Return to the ER  if symptoms worsen

## 2024-03-29 NOTE — NURSING NOTE
Discharge Note: 3/29/2024 1622 Discharged via wheelchair to private car accompanied by PCT, personal belongings taken with pt, no distress noted, no complaints voiced. Wayne HOFFMAN

## 2024-03-29 NOTE — NURSING NOTE
Discharge Note: 3/29/2024 1546 Discharge instructions and pt responsibilities reviewed with pt and copy given. Flu education reviewed with pt and information sheets given. Pt verbalizes understanding of instructions received, verbalizes understanding of when to seek medical attention, denies any home going or personal care needs. Denies further questions or concerns. Reviewed follow up appts with pt and verbalizes understanding. Wayne HOFFMAN

## 2024-03-29 NOTE — PROGRESS NOTES
Subjective Data:  Patient examined at bedside and is anticipating discharge home today. Patient informed of echo findings.     Overnight Events:    None    Objective Data:  Last Recorded Vitals:  Vitals:    03/28/24 2200 03/29/24 0658 03/29/24 0659 03/29/24 0700   BP:    169/78   BP Location:    Right arm   Patient Position:    Sitting   Pulse:    78   Resp:    18   Temp:    36.9 °C (98.4 °F)   TempSrc:    Temporal   SpO2: 94% 94% 94% 95%   Weight:       Height:           Last Labs:  CBC - 3/29/2024:  5:00 AM  10.5 11.2 209    33.3      CMP - 3/29/2024:  5:00 AM  8.9 5.3 32 --- 0.2   _ 3.1 26 62      PTT - No results in last year.  _   _ _     TROPHS   Date/Time Value Ref Range Status   03/28/2024 05:11 AM 50 0 - 13 ng/L Final   03/27/2024 06:00  0 - 13 ng/L Final     Comment:     Previous result verified on 3/27/2024 1409 on specimen/case [id number] called with component TRPHS for procedure Troponin I, High Sensitivity, Initial with value 135 ng/L.   03/27/2024 02:28  0 - 13 ng/L Final     Comment:     Previous result verified on 3/27/2024 1409 on specimen/case [id number] called with component TRPHS for procedure Troponin I, High Sensitivity, Initial with value 135 ng/L.     BNP   Date/Time Value Ref Range Status   03/27/2024 01:33 PM 74 0 - 99 pg/mL Final     LDLCALC   Date/Time Value Ref Range Status   03/28/2024 05:10 AM 57 <=99 mg/dL Final     Comment:                                 Near   Borderline      AGE      Desirable  Optimal    High     High     Very High     0-19 Y     0 - 109     ---    110-129   >/= 130     ----    20-24 Y     0 - 119     ---    120-159   >/= 160     ----      >24 Y     0 -  99   100-129  130-159   160-189     >/=190     12/11/2023 08:51 AM 93 <=99 mg/dL Final     Comment:                                 Near   Borderline      AGE      Desirable  Optimal    High     High     Very High     0-19 Y     0 - 109     ---    110-129   >/= 130     ----    20-24 Y     0  - 119     ---    120-159   >/= 160     ----      >24 Y     0 -  99   100-129  130-159   160-189     >/=190       VLDL   Date/Time Value Ref Range Status   03/28/2024 05:10 AM 16 0 - 40 mg/dL Final   12/11/2023 08:51 AM 13 0 - 40 mg/dL Final      Last I/O:  I/O last 3 completed shifts:  In: 800 (13.3 mL/kg) [P.O.:800]  Out: - (0 mL/kg)   Weight: 60.3 kg     Past Cardiology Tests (Last 3 Years):  EKG:  ECG 12 lead 03/27/2024 (Preliminary)    Echo:  Transthoracic Echo (TTE) Complete 03/28/2024    Ejection Fractions:  EF   Date/Time Value Ref Range Status   03/28/2024 02:15 PM 59 %      Cath:  No results found for this or any previous visit from the past 1095 days.    Stress Test:  No results found for this or any previous visit from the past 1095 days.    Cardiac Imaging:  No results found for this or any previous visit from the past 1095 days.      Inpatient Medications:  Scheduled medications   Medication Dose Route Frequency    aspirin  81 mg oral Daily    atorvastatin  10 mg oral Daily    azithromycin  500 mg oral q24h SELENE    celecoxib  200 mg oral Daily    cholecalciferol  1,000 Units oral Daily    enoxaparin  40 mg subcutaneous q24h    gabapentin  300 mg oral Nightly    lisinopril 20 mg, hydroCHLOROthiazide 12.5 mg for Zestoretic/Prinizide   oral Daily    multivitamin with minerals  1 tablet oral Daily    nicotine  1 patch transdermal Daily    nystatin  5 mL Swish & Swallow 4x daily    oseltamivir  75 mg oral BID    oxygen   inhalation Continuous - Inhalation    oxygen   inhalation Continuous - Inhalation    pantoprazole  40 mg oral Daily before breakfast    polyethylene glycol  17 g oral Daily    predniSONE  40 mg oral Daily    tiZANidine  4 mg oral Daily     PRN medications   Medication    acetaminophen    ALPRAZolam    guaiFENesin    ipratropium-albuteroL    ondansetron ODT    Or    ondansetron     Continuous Medications   Medication Dose Last Rate       Physical Exam:  General: awake, alert and oriented. No  acute distress.   Skin: Skin is warm, dry and intact without rashes or lesions.   HEENT: normocephalic, atraumatic; conjunctivae are clear without exudates or hemorrhage. Sclera is non-icteric. Eyelids are normal in appearance without swelling or lesions. Hearing intact. Nares are patent bilaterally. Moist mucous membranes.   Cardiovascular: heart rate and rhythm are normal. No murmurs, gallops, or rubs are auscultated. S1 and S2 are heard and are of normal intensity. No JVD, no carotid bruits  Respiratory: wheezes present  Gastrointestinal: non-distended, non-tender  Genitourinary: exam deferred  Musculoskeletal: no deformities  Extremities: pulses palpable bilaterally; no swelling or erythema  Neurological: no focal deficits  Psychiatric: appropriate mood and affect; good judgment and insight       Assessment/Plan     Elevated troponin:  -Trending high-sensitivity troponins is [phone]  -EKG displays no evidence of ACS   -Echo shows LVSF is normal with 55-60% EF; grade II diastolic dysfunction  -Recommend outpatient sleep apnea work up  -Patient is hypertensive this AM, however, she reports at home her BP runs 120s/80s  -Given her clinical presentation with influenza, elevated troponins likely from nonischemic myocardial injury from infection rather than MI  -No ischemic evaluation warranted at present   -Will defer management for Influenza to primary team      Peripheral IV 03/27/24 22 G Right;Ventral Forearm (Active)   Site Assessment Clean;Dry;Intact 03/29/24 0600   Dressing Type Transparent 03/29/24 0600   Line Status Saline locked 03/29/24 0600   Dressing Status Clean;Dry 03/29/24 0600   Number of days: 2       Peripheral IV 03/27/24 22 G Proximal;Right Forearm (Active)   Site Assessment Clean;Dry;Intact 03/29/24 0600   Dressing Type Transparent 03/29/24 0600   Line Status Saline locked 03/29/24 0600   Dressing Status Clean;Dry 03/29/24 0600   Number of days: 2       Code Status:  Full Code    Thank you  for allowing me to participate in the care of this patient. Please reach me out if you have any questions or if you need any clarifications regarding the patient's care.          Mary Kovacs, APRN-CNP, DNP

## 2024-03-29 NOTE — NURSING NOTE
"COPD Education    Patient Characteristics: Pt. Sitting in bed on 2L O2 slightly labored respirations.  Comorbidities:                                            Exacerbation last year:                    Moderate: >= 2 exacerbations or >= 1 exacerbation leading to hospitalization    Spirometry: Pt. Has never had a PFT nor has she ever been given a COPD Diagnosis.   Date:             FVC:    (   %) FEV1:    (   %)  FEV1/FVC:    (   %)    Uses of Oxygen/CPAP/BIPAP: pt. Does not have any oxygen, CPAP/BIPAP at home.                                                                Smoking status:  Smoker: Current      PPY: 150PPY     Quit date:      Smoking cessation counseling: Yes, Pt. Does not have much interest in quitting smoking at this time. Pt. Has a lot of stress in her life, and she does not think it is possible to quit with \"her life\".     Booklet given:  Handout and contact information given if pt. Changes her mind and have interest in quitting smoking            Pulmonary physician: Pt. Does not have a Pulmonary Dr. Pt. Has never been told or have a diagnosis or COPD in the past.                     Name:                   Date last seen:                                     Pharmacotherapy:   Maintenance medications   LABA, LAMA, LABA/LAMA, ICS/LABA, ICS/LAMA, ICS/LABA/LAMA,   Name of the medication:  Pt. Does not have any breathing medications at home.     Prednisone: No Theophylline: No  Roflumilast: No  Macrolides: No     If not on maintenance meds:  Consider LAMA or LAMA/LABA   Consider ICS (if peripheral eosinophilia >300cells/microl, COPD exacerbation requiring hospitalization, >= 2 moderated COPD exacerbation, History of or concomitant asthma)    If low inspiratory force or challenges with inhalers   Consider Nebulizers   Consider RESPIMAT      COPD Education   COPD patient education book: Yes, Action Plan     Inhaled medication teach-back: Yes, Pt. Does not want a Nebulizer at home, she states \"My " " has 2 of them\". Pt. Will need the medication ordered to use in one of the nebulizer she has     ANNIA, other videos:      Patient demonstrated understanding/teach back method: Yes         Home Oxygen Evaluation: Walking test needs done prior to Discharge.                                                  Pulmonary Rehabilitation referral: Pt. Not able she currently works 6-7 days a week.  Already attended:       When:                                Info in COPD patient education book              Vaccines                    Influenza:          Pneumococcal:       COVID 19:      Pertussis:                    Recommendations:  Pt. Would benefit if getting a PFT 2 weeks post discharge, along with following up with PCP and or a Pulmonary dr. To see about a COPD Diagnosis/ treatment.      " risk factors

## 2024-03-29 NOTE — NURSING NOTE
0659 - placed patient on Room Air at this time.  0731 - RA at rest SpO2 95%, started walking with patient.   0737 - patient walked for 6 minutes SpO2 95% on room air , SpO2 remained 93-96% the entire walk on room air.   0738 - pt back to bed  - SpO2 95% on room Air.  Results given to Brittanie MILLER CNP.    Chuyita Sherman, RRT  8:53 AM

## 2024-03-29 NOTE — DOCUMENTATION CLARIFICATION NOTE
"    PATIENT:               ANDRÉS PUENTES  ACCT #:                  [id number]  MRN:                       [id number]  :                       1955  ADMIT DATE:       3/27/2024 1:14 PM  DISCH DATE:  RESPONDING PROVIDER #:        [id number]          PROVIDER RESPONSE TEXT:    Acute Hypoxemic Respiratory Failure    CDI QUERY TEXT:    [facility]_Respiratory Failure Acute    Instruction:    Based on your assessment of the patient and the clinical information, please provide the requested documentation by clicking on the appropriate radio button and enter any additional information if prompted.    Question: Is there a diagnosis indicative of the clinical information      When answering this query, please exercise your independent professional judgment. The fact that a question is being asked, does not imply that any particular answer is desired or expected.    The patient's clinical indicators include:  Clinical Information: 67 yo female to ED with shortness of breath    Clinical Indicators: Initial VS 3/27/- 39.3- HR-132-resp rate-44 and O2 sat 88. Respiratory assessment in ED \"Breath sounds: Examination of the right-upper field reveals decreased breath sounds. Examination of the left-upper field reveals decreased breath sounds. Examination of the right-middle field reveals decreased breath sounds. Examination of the left-middle field reveals decreased breath sounds. Examination of the right-lower field reveals decreased breath sounds, wheezing and rhonchi. Examination of the left-lower field reveals decreased breath sounds, wheezing and rhonchi. Decreased breath sounds, wheezing and rhonchi present\".    Treatment: O2 2.5 liters maintained to date, Sylwia-Nebs, Tamiflu for FLU A, and prednisone    Risk Factors: Flu A +, left basilar infiltrate on 3/27/24 CXR, Smoker and elevated Troponin  Options provided:  -- Acute Hypoxemic Respiratory Failure  -- No acute respiratory failure  -- Other - I will add my own diagnosis  -- " Refer to Clinical Documentation Reviewer    Query created by: Linnea Hernandez on 3/29/2024 8:22 AM      Electronically signed by:  AMANDEEP BACA 3/29/2024 11:32 AM

## 2024-03-29 NOTE — CARE PLAN
"The patient's goals for the shift include Fluids and sleep    The clinical goals for the shift include promote rest    Pt. Reported, \"feeling so  much better.\"  More alert.  Drinking fluids.  In a pleasant mood.  Ambulating by self in room without difficulty.  Denied pain several times, this shift.  No distress noted.  Is hoping to be able to go home today.  Call light within reach, will continue to monitor.   Problem: Pain  Goal: My pain/discomfort is manageable  Outcome: Progressing     Problem: Safety  Goal: Patient will be injury free during hospitalization  Outcome: Progressing  Goal: I will remain free of falls  Outcome: Progressing     Problem: Daily Care  Goal: Daily care needs are met  Outcome: Progressing     Problem: Psychosocial Needs  Goal: Demonstrates ability to cope with hospitalization/illness  Outcome: Progressing  Goal: Collaborate with me, my family, and caregiver to identify my specific goals  Outcome: Progressing     Problem: Discharge Barriers  Goal: My discharge needs are met  Outcome: Progressing       "

## 2024-03-29 NOTE — PROGRESS NOTES
Per medical team, patient is medically appropriate for discharge today. Medicare IMM updated within the past 48 hours. Patient's AMPAC score per nursing is 24/24. Plan is for patient to return home today with no Care Transitions needs foreseen. Care Transitions available upon request. AMOS Luna     (4) no impairment

## 2024-03-29 NOTE — DISCHARGE SUMMARY
Discharge Diagnosis  Influenza    Issues Requiring Follow-Up  Influenza  Acute respiratory failure    Discharge Meds     Your medication list        START taking these medications        Instructions Last Dose Given Next Dose Due   ipratropium-albuteroL 0.5-2.5 mg/3 mL nebulizer solution  Commonly known as: Duo-Neb      Take 3 mL by nebulization every 6 hours if needed for shortness of breath.       oseltamivir 75 mg capsule  Commonly known as: Tamiflu      Take 1 capsule (75 mg) by mouth 2 times a day for 6 doses.       predniSONE 20 mg tablet  Commonly known as: Deltasone  Start taking on: March 30, 2024      Take 2 tablets (40 mg) by mouth once daily for 3 doses. Do not start before March 30, 2024.              CONTINUE taking these medications        Instructions Last Dose Given Next Dose Due   ALPRAZolam 0.5 mg tablet  Commonly known as: Xanax           aspirin 81 mg EC tablet           atorvastatin 10 mg tablet  Commonly known as: Lipitor           Caltrate 600 plus D 600 mg-20 mcg (800 unit) tablet,chewable  Generic drug: calcium carbonate-vitamin D3           celecoxib 200 mg capsule  Commonly known as: CeleBREX           cholecalciferol 25 MCG (1000 UT) tablet  Commonly known as: Vitamin D-3           cilostazol 100 mg tablet  Commonly known as: Pletal           gabapentin 300 mg capsule  Commonly known as: Neurontin           lisinopriL-hydrochlorothiazide 20-12.5 mg tablet           multivitamin tablet           nystatin 100,000 unit/mL suspension  Commonly known as: Mycostatin           omeprazole OTC 20 mg EC tablet  Commonly known as: PriLOSEC OTC           tiZANidine 4 mg tablet  Commonly known as: Zanaflex                     Where to Get Your Medications        These medications were sent to [facility]  [address] [place] [address]      Hours: 8 AM to 5:30 Mon-Fri, 8 AM to 4 PM Saturday and Sunday Phone: [phone]   ipratropium-albuteroL 0.5-2.5 mg/3 mL nebulizer  solution  oseltamivir 75 mg capsule  predniSONE 20 mg tablet         Test Results Pending At Discharge  Pending Labs       No current pending labs.            Hospital Course   Nicole Jesus is a 68 y.o. female past medical history of anxiety, osteoarthritis, hyperlipidemia, chronic back pain and she follows with pain management, hypertension, GERD, migraines, and neuroendocrine tumor.  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   Vital signs upon presentation showed temp 39.3, heart rate 132, respiratory rate 44, blood pressure 128/84.  In oxygen saturation 88% on room air.  2 L of oxygen applied pulse ox increased to 91%.  Labs drawn showed serum glucose 100, sodium 130, chloride 96.  Troponin 135 with repeat 141.  BNP negative at 74.  WBC is elevated at 11.8 with neutrophils 8.61.  Patient was positive for influenza A.  Chest x-ray done and shows left basilar infiltrate.  Patient received tylenol, aspirin, toradol, levaquin, and sodium chloride bolus. Patient admitted to telemetry with a working diagnosis of influenza A, tachycardia, elevated troponin, bronchitis, and rule out COPD     Patient seen in the emergency room with daughter at bedside.  Daughter provided majority of history.  Patient stated she began vomiting around 9 PM yesterday evening after coughing episode and then continued vomiting for several episodes.  Daughter states patient complained of dizziness and lightheadedness and was talking to the wall this a.m.  Patient stated she had 2 episodes of diarrhea.  Patient states her  is ill and suffers from cancer    Was seen by COPD navigator.  Patient will follow-up with COPD clinic at discharge for outpatient PFTs and evaluation of COPD.  Patient was seen by cardiology for increased troponins and tachycardia patient will follow-up with COPD clinic at discharge for outpatient PFTs and evaluation of COPD.  Patient was seen by  cardiology for increased troponins and tachycardia.  Echocardiogram done and showed EF of 60% with diastolic dysfunction and elevated troponins secondary to nonischemic myocardial injury from infection...  Walking pulse ox performed the patient does not need oxygen therapy at discharge.  Patient is in fair condition to discharge home.  Patient received azithromycin x 3 doses.  Resume home meds.  He has came to  manage and pharmacy DuoNeb nebulizer treatments, and Tamiflu x 6 doses, prednisone 40 mg x 3 days, nystatin swish and swallow.  Follow-up with PCP in 1 week.  Follow-up with COPD clinic    Pertinent Physical Exam At Time of Discharge  Physical Exam  General Appearance: AAO x 3, not in acute distress  Skin: skin color pink, warm, and dry; no suspicious rashes or lesions  Eyes : PERRL, EOM's intact  ENT: mucous membranes pink and moist  Neck: normocephalic  Respiratory: lungs clear to auscultation anteriorly; no wheezing, rhonchi, or crackles.   Heart: regular rate and rhythm. telemetry shows sinus rhythm  Abdomen: Nondistended, positive bowel sounds x4, soft,  denies tenderness with palpation  Extremities: no edema   Peripheral pulses: normal x4 extremities  Neuro: alert, coherent and conversant, no focal motor deficits  Outpatient Follow-Up  No future appointments.      Masha Bull, NILESH-CNP

## 2024-12-18 ENCOUNTER — HOSPITAL ENCOUNTER (EMERGENCY)
Facility: HOSPITAL | Age: 69
Discharge: HOME | End: 2024-12-18
Attending: EMERGENCY MEDICINE
Payer: MEDICARE

## 2024-12-18 ENCOUNTER — APPOINTMENT (OUTPATIENT)
Dept: RADIOLOGY | Facility: HOSPITAL | Age: 69
End: 2024-12-18
Payer: MEDICARE

## 2024-12-18 VITALS
RESPIRATION RATE: 18 BRPM | DIASTOLIC BLOOD PRESSURE: 88 MMHG | TEMPERATURE: 97.8 F | WEIGHT: 126 LBS | HEIGHT: 62 IN | OXYGEN SATURATION: 96 % | BODY MASS INDEX: 23.19 KG/M2 | HEART RATE: 94 BPM | SYSTOLIC BLOOD PRESSURE: 131 MMHG

## 2024-12-18 DIAGNOSIS — M54.42 ACUTE LEFT-SIDED LOW BACK PAIN WITH LEFT-SIDED SCIATICA: Primary | ICD-10-CM

## 2024-12-18 PROCEDURE — 2500000001 HC RX 250 WO HCPCS SELF ADMINISTERED DRUGS (ALT 637 FOR MEDICARE OP): Performed by: EMERGENCY MEDICINE

## 2024-12-18 PROCEDURE — 72131 CT LUMBAR SPINE W/O DYE: CPT | Performed by: RADIOLOGY

## 2024-12-18 PROCEDURE — 2500000004 HC RX 250 GENERAL PHARMACY W/ HCPCS (ALT 636 FOR OP/ED): Performed by: EMERGENCY MEDICINE

## 2024-12-18 PROCEDURE — 99284 EMERGENCY DEPT VISIT MOD MDM: CPT | Mod: 25 | Performed by: EMERGENCY MEDICINE

## 2024-12-18 PROCEDURE — 96372 THER/PROPH/DIAG INJ SC/IM: CPT | Performed by: EMERGENCY MEDICINE

## 2024-12-18 PROCEDURE — 72131 CT LUMBAR SPINE W/O DYE: CPT

## 2024-12-18 RX ORDER — CYCLOBENZAPRINE HCL 10 MG
10 TABLET ORAL ONCE
Status: COMPLETED | OUTPATIENT
Start: 2024-12-18 | End: 2024-12-18

## 2024-12-18 RX ORDER — KETOROLAC TROMETHAMINE 10 MG/1
10 TABLET, FILM COATED ORAL EVERY 6 HOURS PRN
Qty: 20 TABLET | Refills: 0 | Status: SHIPPED | OUTPATIENT
Start: 2024-12-18 | End: 2024-12-23

## 2024-12-18 RX ORDER — PREDNISONE 20 MG/1
40 TABLET ORAL DAILY
Qty: 10 TABLET | Refills: 0 | Status: SHIPPED | OUTPATIENT
Start: 2024-12-18 | End: 2024-12-23

## 2024-12-18 RX ORDER — LISINOPRIL 10 MG/1
10 TABLET ORAL DAILY
COMMUNITY

## 2024-12-18 RX ORDER — CYCLOBENZAPRINE HCL 5 MG
5 TABLET ORAL 3 TIMES DAILY PRN
Qty: 12 TABLET | Refills: 0 | Status: SHIPPED | OUTPATIENT
Start: 2024-12-18 | End: 2024-12-22

## 2024-12-18 RX ORDER — KETOROLAC TROMETHAMINE 30 MG/ML
15 INJECTION, SOLUTION INTRAMUSCULAR; INTRAVENOUS ONCE
Status: COMPLETED | OUTPATIENT
Start: 2024-12-18 | End: 2024-12-18

## 2024-12-18 ASSESSMENT — COLUMBIA-SUICIDE SEVERITY RATING SCALE - C-SSRS
1. IN THE PAST MONTH, HAVE YOU WISHED YOU WERE DEAD OR WISHED YOU COULD GO TO SLEEP AND NOT WAKE UP?: NO
6. HAVE YOU EVER DONE ANYTHING, STARTED TO DO ANYTHING, OR PREPARED TO DO ANYTHING TO END YOUR LIFE?: NO
2. HAVE YOU ACTUALLY HAD ANY THOUGHTS OF KILLING YOURSELF?: NO

## 2024-12-18 ASSESSMENT — PAIN SCALES - GENERAL
PAINLEVEL_OUTOF10: 5 - MODERATE PAIN
PAINLEVEL_OUTOF10: 10 - WORST POSSIBLE PAIN

## 2024-12-18 ASSESSMENT — PAIN DESCRIPTION - DIRECTION: RADIATING_TOWARDS: LEFT LEG

## 2024-12-18 ASSESSMENT — PAIN DESCRIPTION - ORIENTATION
ORIENTATION: LEFT
ORIENTATION: LEFT

## 2024-12-18 ASSESSMENT — PAIN - FUNCTIONAL ASSESSMENT
PAIN_FUNCTIONAL_ASSESSMENT: 0-10
PAIN_FUNCTIONAL_ASSESSMENT: 0-10

## 2024-12-18 ASSESSMENT — PAIN DESCRIPTION - LOCATION
LOCATION: BACK
LOCATION: BACK

## 2024-12-18 ASSESSMENT — PAIN DESCRIPTION - PAIN TYPE: TYPE: ACUTE PAIN

## 2024-12-18 NOTE — ED PROVIDER NOTES
"HPI   Chief Complaint   Patient presents with    Back Pain     Lower back pain that radiates down left leg. Hx of sciatica on right side. Worse x 24 hours.       Limitations to History: None    HPI: 68-year-old female presents with concern for low back pain that radiates into her left leg.  Patient states that she has had back pain for approximately 4 years.  Patient did go to the chiropractor recently and felt like the pain increased.  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  Denies any fever, chills, loss of bowel or bladder, saddle anesthesia, fall or trauma.    ------------------------------------------------------------------------------------------------------------------------------------------  Physical Exam:    VS: As documented in the triage note and EMR flowsheet from this visit were reviewed.    Appearance: Alert. cooperative,  in no acute distress.   Skin: Intact,  dry skin, no lesions, rash, petechiae or purpura.    HENT: Normocephalic, atraumatic. Nares patent. No intraoral lesions.   Neck: Supple, without meningismus. Trachea at midline. No lymphadenopathy.  Pulmonary: Clear bilaterally with good chest wall excursion. No rales, rhonchi or wheezing. No accessory muscle use or stridor.  Cardiac: Regular rate and rhythm, no rubs, murmurs, or gallops.   Musculoskeletal: Full range of motion.  Pulses full and equal. No cyanosis, clubbing, or edema.  Tenderness to palpation in the left paraspinal lumbar musculature.  Neurological: Sensation grossly intact to the bilateral lower extremity.  Psychiatric: Appropriate mood and affect.                Patient History   Past Medical History:   Diagnosis Date    Hypertension      Past Surgical History:   Procedure Laterality Date    ABDOMINAL SURGERY      \"Tummy Tuck\"    BREAST SURGERY      CATARACT EXTRACTION      COSMETIC SURGERY      Rhinoplasy    HYSTERECTOMY      PANCREAS SURGERY       No family history " on file.  Social History     Tobacco Use    Smoking status: Every Day     Current packs/day: 3.00     Average packs/day: 3.0 packs/day for 50.0 years (150.0 ttl pk-yrs)     Types: Cigarettes    Smokeless tobacco: Never   Vaping Use    Vaping status: Never Used   Substance Use Topics    Alcohol use: Never    Drug use: Never       Physical Exam   ED Triage Vitals [12/18/24 0819]   Temperature Heart Rate Respirations BP   36.6 °C (97.8 °F) 96 20 (!) 130/99      Pulse Ox Temp src Heart Rate Source Patient Position   95 % -- -- --      BP Location FiO2 (%)     -- --       Physical Exam      ED Course & MDM   Diagnoses as of 12/18/24 1009   Acute left-sided low back pain with left-sided sciatica                 No data recorded     Freer Coma Scale Score: 15 (12/18/24 0820 : Dale Capone RN)                           Medical Decision Making  CT lumbar spine wo IV contrast   Final Result    Scoliosis and DJD throughout the lumbosacral spine as described.    Please see above for details.          No lumbar spine fracture based on this exam.          MACRO:    None          Signed by: Ortiz Mendoza 12/18/2024 9:58 AM    Dictation workstation:   [id number]     Medical Decision Making:    Patient appears well nontoxic.  Low concern for acute cauda equina syndrome.  Patient treated with intramuscular Toradol and oral cyclobenzaprine.  CT of the lumbar spine shows no acute fracture.  Patient will be treated with oral ketorolac, cyclobenzaprine, prednisone.  Advised on follow-up with primary care.  Stable at time of discharge.    Differential Diagnoses Considered: Lumbar strain, sciatica, degenerative disc disease, disc herniation, fracture    Independent Interpretation of Studies:  I independently interpreted: CT of the lumbar spine shows no acute fracture.    Escalation of Care:  Appropriate for discharge and follow-up with primary care.    Prescription Drug Consideration: Oral ketorolac, cyclobenzaprine,  prednisone.          Procedure  Procedures     Miguel Tejeda,   12/18/24 1014

## (undated) DEVICE — Device